# Patient Record
Sex: FEMALE | Race: WHITE | NOT HISPANIC OR LATINO | Employment: OTHER | URBAN - METROPOLITAN AREA
[De-identification: names, ages, dates, MRNs, and addresses within clinical notes are randomized per-mention and may not be internally consistent; named-entity substitution may affect disease eponyms.]

---

## 2017-04-04 ENCOUNTER — OFFICE VISIT (OUTPATIENT)
Dept: FAMILY MEDICINE CLINIC | Facility: CLINIC | Age: 57
End: 2017-04-04

## 2017-04-04 VITALS
DIASTOLIC BLOOD PRESSURE: 82 MMHG | SYSTOLIC BLOOD PRESSURE: 124 MMHG | RESPIRATION RATE: 18 BRPM | WEIGHT: 239 LBS | HEART RATE: 72 BPM | OXYGEN SATURATION: 97 % | BODY MASS INDEX: 40.8 KG/M2 | HEIGHT: 64 IN

## 2017-04-04 DIAGNOSIS — L72.3 SEBACEOUS CYST: ICD-10-CM

## 2017-04-04 DIAGNOSIS — J45.21 REACTIVE AIRWAY DISEASE, MILD INTERMITTENT, WITH ACUTE EXACERBATION: ICD-10-CM

## 2017-04-04 DIAGNOSIS — Z00.00 ANNUAL PHYSICAL EXAM: ICD-10-CM

## 2017-04-04 DIAGNOSIS — E55.9 HYPOVITAMINOSIS D: ICD-10-CM

## 2017-04-04 DIAGNOSIS — J40 BRONCHITIS: Primary | ICD-10-CM

## 2017-04-04 PROCEDURE — 99214 OFFICE O/P EST MOD 30 MIN: CPT | Performed by: FAMILY MEDICINE

## 2017-04-04 RX ORDER — CEFUROXIME AXETIL 250 MG/1
250 TABLET ORAL 2 TIMES DAILY
Qty: 14 TABLET | Refills: 0 | Status: SHIPPED | OUTPATIENT
Start: 2017-04-04 | End: 2017-11-06

## 2017-04-04 RX ORDER — ALBUTEROL SULFATE 90 UG/1
2 AEROSOL, METERED RESPIRATORY (INHALATION) EVERY 4 HOURS PRN
Qty: 1 INHALER | Refills: 11 | Status: SHIPPED | OUTPATIENT
Start: 2017-04-04 | End: 2018-06-15 | Stop reason: SDUPTHER

## 2017-04-04 NOTE — PROGRESS NOTES
"Subjective   Rosalie Mcdonald is a 57 y.o. female.     History of Present Illness CC- Thinks the flu for the last 9 days. Thinks she is 90% better. Temp was 100+ even with alkaselzer cold. Dry cough. \"Horrible\" headache like 10 on scale when she coughs now,  and body aches. Chest congestion with wheezing. Has never used an inhaler. Currently hard to breath at night- doesn't feel like air gets in or out , jackie without wheeze. Sore throat and weakness yesterday.    Knows she has high cholesterol. Wants a panel of labs.        The following portions of the patient's history were reviewed and updated as appropriate: allergies, current medications, past social history and problem list.    Review of Systems   Constitutional: Positive for fatigue and fever. Negative for activity change and unexpected weight change.   HENT: Positive for congestion, postnasal drip, sinus pressure, sneezing and sore throat. Negative for ear pain.    Eyes: Negative for pain and discharge.   Respiratory: Positive for cough and wheezing. Negative for chest tightness and shortness of breath.    Cardiovascular: Negative for chest pain and palpitations.   Gastrointestinal: Negative for abdominal pain, diarrhea and vomiting.   Endocrine: Negative.    Genitourinary: Negative.    Musculoskeletal: Positive for arthralgias. Negative for joint swelling.   Skin: Negative for color change, rash and wound.   Allergic/Immunologic: Negative.    Neurological: Negative for seizures and syncope.   Psychiatric/Behavioral: Negative.        Objective   /82  Pulse 72  Resp 18  Ht 64\" (162.6 cm)  Wt 239 lb (108 kg)  SpO2 97%  BMI 41.02 kg/m2  Physical Exam   Constitutional: She appears well-developed and well-nourished.   HENT:   Head: Normocephalic and atraumatic.   Right Ear: Tympanic membrane, external ear and ear canal normal.   Left Ear: Tympanic membrane, external ear and ear canal normal.   Mouth/Throat: Oropharynx is clear and moist.   Eyes: " Conjunctivae are normal. Right eye exhibits no discharge. Left eye exhibits no discharge.   Neck: Normal range of motion. Neck supple. No thyromegaly present.   Cardiovascular: Normal rate, regular rhythm and normal heart sounds.    Pulmonary/Chest: Effort normal. No respiratory distress. She has wheezes (mild bilat). She has no rales.   Cough sounds congested   Lymphadenopathy:     She has no cervical adenopathy.   Skin: Skin is warm and dry.   R post shoulder 3 cm eryth tender seb cyst.  Scalp is tender to palpation on R parietal area.   Psychiatric: She has a normal mood and affect. Judgment normal.   Vitals reviewed.      Assessment/Plan   Problem List Items Addressed This Visit     None      Visit Diagnoses     Bronchitis    -  Primary    Relevant Medications    albuterol (PROVENTIL HFA;VENTOLIN HFA) 108 (90 BASE) MCG/ACT inhaler    Sebaceous cyst        Relevant Medications    cefuroxime (CEFTIN) 250 MG tablet    Other Relevant Orders    Ambulatory Referral to General Surgery    Reactive airway disease, mild intermittent, with acute exacerbation        Relevant Medications    albuterol (PROVENTIL HFA;VENTOLIN HFA) 108 (90 BASE) MCG/ACT inhaler    Annual physical exam        Relevant Orders    CBC & Differential    Comprehensive Metabolic Panel    Lipid Panel With / Chol / HDL Ratio    TSH    Hypovitaminosis D        Relevant Orders    Vitamin D 25 Hydroxy

## 2017-04-05 LAB
25(OH)D3+25(OH)D2 SERPL-MCNC: 15.5 NG/ML (ref 30–100)
ALBUMIN SERPL-MCNC: 4.1 G/DL (ref 3.5–5.2)
ALBUMIN/GLOB SERPL: 1.4 G/DL
ALP SERPL-CCNC: 122 U/L (ref 39–117)
ALT SERPL-CCNC: 17 U/L (ref 1–33)
AST SERPL-CCNC: 17 U/L (ref 1–32)
BASOPHILS # BLD AUTO: 0.01 10*3/MM3 (ref 0–0.2)
BASOPHILS NFR BLD AUTO: 0.2 % (ref 0–1.5)
BILIRUB SERPL-MCNC: 0.3 MG/DL (ref 0.1–1.2)
BUN SERPL-MCNC: 13 MG/DL (ref 6–20)
BUN/CREAT SERPL: 19.1 (ref 7–25)
CALCIUM SERPL-MCNC: 9.8 MG/DL (ref 8.6–10.5)
CHLORIDE SERPL-SCNC: 103 MMOL/L (ref 98–107)
CHOLEST SERPL-MCNC: 194 MG/DL (ref 0–200)
CHOLEST/HDLC SERPL: 4.97 {RATIO}
CO2 SERPL-SCNC: 27.8 MMOL/L (ref 22–29)
CREAT SERPL-MCNC: 0.68 MG/DL (ref 0.57–1)
EOSINOPHIL # BLD AUTO: 0.06 10*3/MM3 (ref 0–0.7)
EOSINOPHIL NFR BLD AUTO: 1 % (ref 0.3–6.2)
ERYTHROCYTE [DISTWIDTH] IN BLOOD BY AUTOMATED COUNT: 13.6 % (ref 11.7–13)
GLOBULIN SER CALC-MCNC: 2.9 GM/DL
GLUCOSE SERPL-MCNC: 93 MG/DL (ref 65–99)
HCT VFR BLD AUTO: 40.8 % (ref 35.6–45.5)
HDLC SERPL-MCNC: 39 MG/DL (ref 40–60)
HGB BLD-MCNC: 12.5 G/DL (ref 11.9–15.5)
IMM GRANULOCYTES # BLD: 0.02 10*3/MM3 (ref 0–0.03)
IMM GRANULOCYTES NFR BLD: 0.3 % (ref 0–0.5)
LDLC SERPL CALC-MCNC: 119 MG/DL (ref 0–100)
LYMPHOCYTES # BLD AUTO: 2.21 10*3/MM3 (ref 0.9–4.8)
LYMPHOCYTES NFR BLD AUTO: 38.5 % (ref 19.6–45.3)
MCH RBC QN AUTO: 27.8 PG (ref 26.9–32)
MCHC RBC AUTO-ENTMCNC: 30.6 G/DL (ref 32.4–36.3)
MCV RBC AUTO: 90.7 FL (ref 80.5–98.2)
MONOCYTES # BLD AUTO: 0.29 10*3/MM3 (ref 0.2–1.2)
MONOCYTES NFR BLD AUTO: 5.1 % (ref 5–12)
NEUTROPHILS # BLD AUTO: 3.15 10*3/MM3 (ref 1.9–8.1)
NEUTROPHILS NFR BLD AUTO: 54.9 % (ref 42.7–76)
PLATELET # BLD AUTO: 306 10*3/MM3 (ref 140–500)
POTASSIUM SERPL-SCNC: 4.8 MMOL/L (ref 3.5–5.2)
PROT SERPL-MCNC: 7 G/DL (ref 6–8.5)
RBC # BLD AUTO: 4.5 10*6/MM3 (ref 3.9–5.2)
SODIUM SERPL-SCNC: 143 MMOL/L (ref 136–145)
TRIGL SERPL-MCNC: 179 MG/DL (ref 0–150)
TSH SERPL DL<=0.005 MIU/L-ACNC: 1.57 MIU/ML (ref 0.27–4.2)
VLDLC SERPL CALC-MCNC: 35.8 MG/DL (ref 5–40)
WBC # BLD AUTO: 5.74 10*3/MM3 (ref 4.5–10.7)

## 2017-09-22 ENCOUNTER — OFFICE VISIT (OUTPATIENT)
Dept: FAMILY MEDICINE CLINIC | Facility: CLINIC | Age: 57
End: 2017-09-22

## 2017-09-22 VITALS
OXYGEN SATURATION: 100 % | WEIGHT: 242 LBS | TEMPERATURE: 98.9 F | DIASTOLIC BLOOD PRESSURE: 82 MMHG | HEIGHT: 64 IN | HEART RATE: 72 BPM | SYSTOLIC BLOOD PRESSURE: 124 MMHG | BODY MASS INDEX: 41.32 KG/M2

## 2017-09-22 DIAGNOSIS — D22.9 ATYPICAL NEVI: ICD-10-CM

## 2017-09-22 DIAGNOSIS — N81.4 UTERINE PROLAPSE: ICD-10-CM

## 2017-09-22 DIAGNOSIS — E66.01 MORBID OBESITY DUE TO EXCESS CALORIES (HCC): ICD-10-CM

## 2017-09-22 DIAGNOSIS — R53.82 CHRONIC FATIGUE: Primary | ICD-10-CM

## 2017-09-22 DIAGNOSIS — E55.9 HYPOVITAMINOSIS D: ICD-10-CM

## 2017-09-22 LAB — 25(OH)D3+25(OH)D2 SERPL-MCNC: 22.8 NG/ML (ref 30–100)

## 2017-09-22 PROCEDURE — 99213 OFFICE O/P EST LOW 20 MIN: CPT | Performed by: FAMILY MEDICINE

## 2017-09-22 NOTE — PROGRESS NOTES
"Subjective   Rosalie Mcdonald is a 57 y.o. female.     History of Present Illness . CCis fatigue, and wants lab work done. Interested in Contrave pill bc believes wt loss will give her energy. BMI is 41.5.  Not leaving house. Years ago welbutrin helped her with wt loss and energy. She likes the idea that Contrave has welbutrin . She will private pay for it.  Goal: would like to be able to ride horses again.    Has a bladder prolapse she wants seen by gynecol and has not been to one for more than 10 years.    Gym- treadmill, rowing machine, weights.  The following portions of the patient's history were reviewed and updated as appropriate: allergies, current medications, past social history and problem list.    Review of Systems   Constitutional: Positive for fatigue. Negative for activity change, appetite change and unexpected weight change.   HENT: Negative for nosebleeds and trouble swallowing.    Eyes: Negative for pain and visual disturbance.   Respiratory: Negative for chest tightness, shortness of breath and wheezing.    Cardiovascular: Negative for chest pain and palpitations.   Gastrointestinal: Negative for abdominal pain and blood in stool.   Endocrine: Negative.    Genitourinary: Positive for difficulty urinating (prolapsed uterus.). Negative for hematuria.   Musculoskeletal: Negative for joint swelling.   Skin: Negative for color change and rash.   Allergic/Immunologic: Negative.    Neurological: Negative for syncope and speech difficulty.   Hematological: Negative for adenopathy.   Psychiatric/Behavioral: Negative for agitation and confusion.   All other systems reviewed and are negative.      Objective   /82 (BP Location: Right arm, Patient Position: Sitting, Cuff Size: Adult)  Pulse 72  Temp 98.9 °F (37.2 °C) (Oral)   Ht 64\" (162.6 cm)  Wt 242 lb (110 kg)  SpO2 100%  BMI 41.54 kg/m2  Physical Exam   Constitutional: She is oriented to person, place, and time. Vital signs are normal. She " appears well-developed and well-nourished. No distress.   HENT:   Head: Normocephalic.   Neck: Carotid bruit is not present. No thyromegaly present.   Cardiovascular: Normal rate, regular rhythm and normal heart sounds.    Pulmonary/Chest: Effort normal and breath sounds normal.   Neurological: She is alert and oriented to person, place, and time. Gait normal.   Psychiatric: She has a normal mood and affect. Her behavior is normal. Judgment and thought content normal.   Vitals reviewed.      Assessment/Plan   Problem List Items Addressed This Visit        Other    Chronic fatigue - Primary    Relevant Medications    naltrexone-bupropion ER (CONTRAVE) 8-90 MG tablet      Other Visit Diagnoses     Morbid obesity due to excess calories        Relevant Medications    naltrexone-bupropion ER (CONTRAVE) 8-90 MG tablet    Hypovitaminosis D        Relevant Orders    Vitamin D 25 Hydroxy (Completed)    Atypical nevi        Relevant Orders    Ambulatory Referral to Dermatology    Uterine prolapse        Relevant Orders    Ambulatory Referral to Gynecology           Did discuss the poss of increased depression with contrave. She will immed call if  becomes depressed..  Increase the gym to be done on a reg  Basis.

## 2017-09-23 PROBLEM — R53.82 CHRONIC FATIGUE: Status: ACTIVE | Noted: 2017-09-23

## 2017-11-06 ENCOUNTER — OFFICE VISIT (OUTPATIENT)
Dept: FAMILY MEDICINE CLINIC | Facility: CLINIC | Age: 57
End: 2017-11-06

## 2017-11-06 VITALS
OXYGEN SATURATION: 97 % | HEIGHT: 64 IN | DIASTOLIC BLOOD PRESSURE: 84 MMHG | HEART RATE: 67 BPM | RESPIRATION RATE: 18 BRPM | WEIGHT: 238 LBS | BODY MASS INDEX: 40.63 KG/M2 | SYSTOLIC BLOOD PRESSURE: 128 MMHG

## 2017-11-06 DIAGNOSIS — G61.0 GUILLAIN BARRÉ SYNDROME (HCC): ICD-10-CM

## 2017-11-06 DIAGNOSIS — F32.A DEPRESSION, UNSPECIFIED DEPRESSION TYPE: Primary | ICD-10-CM

## 2017-11-06 PROCEDURE — 99214 OFFICE O/P EST MOD 30 MIN: CPT | Performed by: FAMILY MEDICINE

## 2017-11-06 RX ORDER — BUPROPION HYDROCHLORIDE 300 MG/1
TABLET ORAL
Qty: 30 TABLET | Refills: 5 | Status: SHIPPED | OUTPATIENT
Start: 2017-11-06 | End: 2018-06-15 | Stop reason: SDUPTHER

## 2017-11-06 NOTE — PROGRESS NOTES
"Subjective   Rosalie Mcdonald is a 57 y.o. female.     History of Present Illness   Wt loss 4 pounds. Was unable to take 2 bid of Contrave, shaky, nauseous slightly, and nervous. Aims for 1200 greg per day. In gen she felt close to that number. Has been eating in general low salt.   Treadmill at the gym. [place] of [place] has lost muscle mass. 3x a week, walking for 40 min.  She thinks will just do better on wellbutren which she has been on in the past.    Says she has flares of BGS. Feet feel like lead at times. Stress brings it on. Even eyes feel sheaqvy at times, which she says she had with GBS.    The following portions of the patient's history were reviewed and updated as appropriate: allergies, current medications, past social history and problem list.    Review of Systems   Constitutional: Positive for fatigue. Negative for activity change, appetite change, fever and unexpected weight change.   HENT: Negative for nosebleeds and trouble swallowing.    Eyes: Negative for pain and visual disturbance.   Respiratory: Negative for chest tightness, shortness of breath and wheezing.    Cardiovascular: Negative for chest pain and palpitations.   Gastrointestinal: Negative for abdominal pain and blood in stool.   Endocrine: Negative.    Genitourinary: Negative for difficulty urinating and hematuria.   Musculoskeletal: Positive for arthralgias (feet and legs ache and feel heavy at times). Negative for joint swelling.   Skin: Negative for color change and rash.   Allergic/Immunologic: Negative.    Neurological: Negative for syncope and speech difficulty.   Hematological: Negative for adenopathy.   Psychiatric/Behavioral: Negative for agitation and confusion.   All other systems reviewed and are negative.      Objective   /84  Pulse 67  Resp 18  Ht 64\" (162.6 cm)  Wt 238 lb (108 kg)  SpO2 97%  BMI 40.85 kg/m2  Physical Exam   Constitutional: She is oriented to person, place, and time. She appears " well-developed and well-nourished. No distress.   HENT:   Head: Normocephalic and atraumatic.   Eyes: Conjunctivae and EOM are normal. Pupils are equal, round, and reactive to light. Right eye exhibits no discharge. Left eye exhibits no discharge. No scleral icterus.   Neck: Normal range of motion. Neck supple. No tracheal deviation present. No thyromegaly present.   Cardiovascular: Normal rate, regular rhythm, normal heart sounds, intact distal pulses and normal pulses.  Exam reveals no gallop.    No murmur heard.  Pulmonary/Chest: Effort normal and breath sounds normal. No respiratory distress. She has no wheezes. She has no rales.   Musculoskeletal: Normal range of motion.   Neurological: She is alert and oriented to person, place, and time. She exhibits normal muscle tone. Coordination normal.   Skin: Skin is warm. No rash noted. No erythema. No pallor.   Psychiatric: She has a normal mood and affect. Her behavior is normal. Judgment and thought content normal.   Nursing note and vitals reviewed.      Assessment/Plan   Problem List Items Addressed This Visit        Digestive    BMI 40.0-44.9, adult       Nervous and Auditory    Guillain Barré syndrome      Other Visit Diagnoses     Depression, unspecified depression type    -  Primary    Relevant Medications    buPROPion XL (WELLBUTRIN XL) 300 MG 24 hr tablet           Pt needs to further cut down on calories and continue exercise.  I have removed hypertension from her problem list today as she is not any antihypertensives and her BP is clearly normal.    I suspect that anything that causes fatigue in her life, for example shopping in a mall, gives her an increase in the feelings she had with GBS. However, it is not a true recurrence.

## 2018-06-15 ENCOUNTER — OFFICE VISIT (OUTPATIENT)
Dept: FAMILY MEDICINE CLINIC | Facility: CLINIC | Age: 58
End: 2018-06-15

## 2018-06-15 VITALS
HEIGHT: 64 IN | WEIGHT: 240.3 LBS | SYSTOLIC BLOOD PRESSURE: 122 MMHG | OXYGEN SATURATION: 98 % | DIASTOLIC BLOOD PRESSURE: 70 MMHG | BODY MASS INDEX: 41.03 KG/M2 | HEART RATE: 75 BPM | TEMPERATURE: 97.5 F

## 2018-06-15 DIAGNOSIS — Z12.4 SCREENING FOR CERVICAL CANCER: ICD-10-CM

## 2018-06-15 DIAGNOSIS — F32.A DEPRESSION, UNSPECIFIED DEPRESSION TYPE: ICD-10-CM

## 2018-06-15 DIAGNOSIS — E55.9 VITAMIN D DEFICIENCY: ICD-10-CM

## 2018-06-15 DIAGNOSIS — Z11.59 NEED FOR HEPATITIS C SCREENING TEST: ICD-10-CM

## 2018-06-15 DIAGNOSIS — Z13.29 SCREENING FOR THYROID DISORDER: ICD-10-CM

## 2018-06-15 DIAGNOSIS — Z12.39 SCREENING FOR BREAST CANCER: Primary | ICD-10-CM

## 2018-06-15 DIAGNOSIS — Z13.220 SCREENING FOR LIPOID DISORDERS: ICD-10-CM

## 2018-06-15 DIAGNOSIS — Z86.69 HISTORY OF GUILLAIN-BARRE SYNDROME: ICD-10-CM

## 2018-06-15 DIAGNOSIS — Z79.899 MEDICATION MANAGEMENT: ICD-10-CM

## 2018-06-15 DIAGNOSIS — J45.21 REACTIVE AIRWAY DISEASE, MILD INTERMITTENT, WITH ACUTE EXACERBATION: ICD-10-CM

## 2018-06-15 PROCEDURE — 99214 OFFICE O/P EST MOD 30 MIN: CPT | Performed by: NURSE PRACTITIONER

## 2018-06-15 RX ORDER — ALBUTEROL SULFATE 90 UG/1
2 AEROSOL, METERED RESPIRATORY (INHALATION) EVERY 4 HOURS PRN
Qty: 1 INHALER | Refills: 11 | Status: SHIPPED | OUTPATIENT
Start: 2018-06-15 | End: 2019-12-30 | Stop reason: SDUPTHER

## 2018-06-15 RX ORDER — BUPROPION HYDROCHLORIDE 300 MG/1
TABLET ORAL
Qty: 30 TABLET | Refills: 5 | Status: SHIPPED | OUTPATIENT
Start: 2018-06-15 | End: 2019-01-28 | Stop reason: SDUPTHER

## 2018-06-15 NOTE — PROGRESS NOTES
Subjective   Rosalie Mcdonald is a 58 y.o. female presents as a new patient to get established and for medication refills. Currently on wellbutrin 300 mg daily. Working well. Has helped with weight loss previously but not now.    She has a history of guillain barre syndrome, followed by neurology, states she needs to get a referral to a new neurologist.    Last pap smear 10-15 years ago. Agreeable to get established. Not up to date on mammogram, has previously had an abnormal and would like to get that done prior to GYN referral.    Depression   Visit Type: follow-up  Patient presents with the following symptoms: excessive worry, irritability and nervousness/anxiety.  Patient is not experiencing: suicidal ideas, suicidal planning and thoughts of death.  Frequency of symptoms: most days   Severity: moderate   Sleep quality: good  Nighttime awakenings: none  Compliance with medications:  %             The following portions of the patient's history were reviewed and updated as appropriate: allergies, current medications, past family history, past medical history, past social history, past surgical history and problem list.    Review of Systems   Constitutional: Positive for irritability.   HENT: Negative.    Eyes: Negative.    Respiratory: Negative.    Cardiovascular: Negative.    Gastrointestinal: Negative.    Endocrine: Negative.    Genitourinary: Negative.    Musculoskeletal: Negative.    Skin: Negative.    Allergic/Immunologic: Negative.    Neurological: Negative.    Hematological: Negative.    Psychiatric/Behavioral: Negative for suicidal ideas. The patient is nervous/anxious.        Objective   Physical Exam   Constitutional: She is oriented to person, place, and time. She appears well-developed and well-nourished.   HENT:   Head: Normocephalic and atraumatic.   Right Ear: Tympanic membrane and ear canal normal.   Left Ear: Tympanic membrane and ear canal normal.   Nose: Nose normal.   Mouth/Throat: Uvula is  midline, oropharynx is clear and moist and mucous membranes are normal.   Eyes: Pupils are equal, round, and reactive to light.   Neck: Neck supple.   Cardiovascular: Normal rate, regular rhythm and normal heart sounds.  Exam reveals no gallop and no friction rub.    No murmur heard.  Pulmonary/Chest: Effort normal and breath sounds normal. No respiratory distress. She has no wheezes. She has no rales.   Abdominal: Soft. Bowel sounds are normal. She exhibits no distension. There is no tenderness.   Neurological: She is alert and oriented to person, place, and time.   Skin: Skin is warm and dry.   Psychiatric: She has a normal mood and affect.   Vitals reviewed.      Assessment/Plan   Rosalie was seen today for depression.    Diagnoses and all orders for this visit:    Screening for breast cancer  -     Mammo Screening Bilateral With CAD; Future    Depression, unspecified depression type  -     buPROPion XL (WELLBUTRIN XL) 300 MG 24 hr tablet; 1 per day    Reactive airway disease, mild intermittent, with acute exacerbation  -     albuterol (PROVENTIL HFA;VENTOLIN HFA) 108 (90 Base) MCG/ACT inhaler; Inhale 2 puffs Every 4 (Four) Hours As Needed (2 puffs q 4 hr prn).    Screening for cervical cancer  -     Ambulatory Referral to Gynecology    Need for hepatitis C screening test  -     Hepatitis C antibody    Screening for thyroid disorder  -     T4  -     TSH    Vitamin D deficiency  -     Vitamin D 25 hydroxy    History of Guillain-Sully syndrome  -     Ambulatory Referral to Neurology    Screening for lipoid disorders  -     Lipid Panel With LDL / HDL Ratio    Medication management  -     Comprehensive metabolic panel

## 2018-06-15 NOTE — PATIENT INSTRUCTIONS
Labs today.  Mammogram in the near future.  Referral to Dr. Henry, GYN  Referral to neurology  Continue wellbutrin as prescribed.

## 2018-06-16 LAB
25(OH)D3+25(OH)D2 SERPL-MCNC: 32 NG/ML (ref 30–100)
ALBUMIN SERPL-MCNC: 4.4 G/DL (ref 3.5–5.2)
ALBUMIN/GLOB SERPL: 1.6 G/DL
ALP SERPL-CCNC: 132 U/L (ref 39–117)
ALT SERPL-CCNC: 7 U/L (ref 1–33)
AST SERPL-CCNC: 14 U/L (ref 1–32)
BILIRUB SERPL-MCNC: 0.3 MG/DL (ref 0.1–1.2)
BUN SERPL-MCNC: 16 MG/DL (ref 6–20)
BUN/CREAT SERPL: 20 (ref 7–25)
CALCIUM SERPL-MCNC: 10.5 MG/DL (ref 8.6–10.5)
CHLORIDE SERPL-SCNC: 102 MMOL/L (ref 98–107)
CHOLEST SERPL-MCNC: 254 MG/DL (ref 0–200)
CO2 SERPL-SCNC: 28.2 MMOL/L (ref 22–29)
CREAT SERPL-MCNC: 0.8 MG/DL (ref 0.57–1)
GFR SERPLBLD CREATININE-BSD FMLA CKD-EPI: 74 ML/MIN/1.73
GFR SERPLBLD CREATININE-BSD FMLA CKD-EPI: 90 ML/MIN/1.73
GLOBULIN SER CALC-MCNC: 2.8 GM/DL
GLUCOSE SERPL-MCNC: 100 MG/DL (ref 65–99)
HCV AB S/CO SERPL IA: <0.1 S/CO RATIO (ref 0–0.9)
HDLC SERPL-MCNC: 51 MG/DL (ref 40–60)
LDLC SERPL CALC-MCNC: 166 MG/DL (ref 0–100)
LDLC/HDLC SERPL: 3.25 {RATIO}
POTASSIUM SERPL-SCNC: 4.6 MMOL/L (ref 3.5–5.2)
PROT SERPL-MCNC: 7.2 G/DL (ref 6–8.5)
SODIUM SERPL-SCNC: 143 MMOL/L (ref 136–145)
T4 SERPL-MCNC: 8.48 MCG/DL (ref 4.5–11.7)
TRIGL SERPL-MCNC: 185 MG/DL (ref 0–150)
TSH SERPL DL<=0.005 MIU/L-ACNC: 3.54 MIU/ML (ref 0.27–4.2)
VLDLC SERPL CALC-MCNC: 37 MG/DL (ref 5–40)

## 2018-06-21 ENCOUNTER — HOSPITAL ENCOUNTER (OUTPATIENT)
Dept: MAMMOGRAPHY | Facility: HOSPITAL | Age: 58
Discharge: HOME | End: 2018-06-21
Admitting: NURSE PRACTITIONER

## 2018-06-21 DIAGNOSIS — Z12.39 SCREENING FOR BREAST CANCER: ICD-10-CM

## 2018-06-21 PROCEDURE — 77067 SCR MAMMO BI INCL CAD: CPT

## 2018-06-25 DIAGNOSIS — R92.8 ABNORMALITY OF LEFT BREAST ON SCREENING MAMMOGRAM: Primary | ICD-10-CM

## 2018-06-29 ENCOUNTER — HOSPITAL ENCOUNTER (OUTPATIENT)
Dept: ULTRASOUND IMAGING | Facility: HOSPITAL | Age: 58
Discharge: HOME | End: 2018-06-29

## 2018-06-29 ENCOUNTER — HOSPITAL ENCOUNTER (OUTPATIENT)
Dept: MAMMOGRAPHY | Facility: HOSPITAL | Age: 58
Discharge: HOME | End: 2018-06-29
Admitting: NURSE PRACTITIONER

## 2018-06-29 DIAGNOSIS — R92.8 ABNORMALITY OF LEFT BREAST ON SCREENING MAMMOGRAM: ICD-10-CM

## 2018-06-29 PROCEDURE — 76642 ULTRASOUND BREAST LIMITED: CPT

## 2018-06-29 PROCEDURE — 77065 DX MAMMO INCL CAD UNI: CPT

## 2018-10-10 ENCOUNTER — OFFICE VISIT (OUTPATIENT)
Dept: OBSTETRICS AND GYNECOLOGY | Facility: CLINIC | Age: 58
End: 2018-10-10

## 2018-10-10 ENCOUNTER — PROCEDURE VISIT (OUTPATIENT)
Dept: OBSTETRICS AND GYNECOLOGY | Facility: CLINIC | Age: 58
End: 2018-10-10

## 2018-10-10 VITALS
HEIGHT: 64 IN | WEIGHT: 238.2 LBS | SYSTOLIC BLOOD PRESSURE: 148 MMHG | DIASTOLIC BLOOD PRESSURE: 82 MMHG | BODY MASS INDEX: 40.67 KG/M2

## 2018-10-10 DIAGNOSIS — N85.2 BULKY OR ENLARGED UTERUS: Primary | ICD-10-CM

## 2018-10-10 DIAGNOSIS — R21 RASH: ICD-10-CM

## 2018-10-10 DIAGNOSIS — N64.59 ABNORMAL BREAST FINDING: ICD-10-CM

## 2018-10-10 DIAGNOSIS — N81.2 INCOMPLETE UTEROVAGINAL PROLAPSE: ICD-10-CM

## 2018-10-10 DIAGNOSIS — Z13.9 SCREENING FOR CONDITION: Primary | ICD-10-CM

## 2018-10-10 DIAGNOSIS — R10.2 PELVIC PAIN IN FEMALE: ICD-10-CM

## 2018-10-10 DIAGNOSIS — Z01.419 WELL WOMAN EXAM WITH ROUTINE GYNECOLOGICAL EXAM: ICD-10-CM

## 2018-10-10 DIAGNOSIS — N94.10 DYSPAREUNIA, FEMALE: ICD-10-CM

## 2018-10-10 DIAGNOSIS — D21.9 FIBROIDS: ICD-10-CM

## 2018-10-10 LAB
BILIRUB BLD-MCNC: NEGATIVE MG/DL
CLARITY, POC: CLEAR
COLOR UR: YELLOW
GLUCOSE UR STRIP-MCNC: NEGATIVE MG/DL
KETONES UR QL: ABNORMAL
LEUKOCYTE EST, POC: NEGATIVE
NITRITE UR-MCNC: NEGATIVE MG/ML
PH UR: 5 [PH] (ref 5–8)
PROT UR STRIP-MCNC: NEGATIVE MG/DL
RBC # UR STRIP: NEGATIVE /UL
SP GR UR: 1 (ref 1–1.03)
UROBILINOGEN UR QL: NORMAL

## 2018-10-10 PROCEDURE — 99386 PREV VISIT NEW AGE 40-64: CPT | Performed by: OBSTETRICS & GYNECOLOGY

## 2018-10-10 PROCEDURE — 81002 URINALYSIS NONAUTO W/O SCOPE: CPT | Performed by: OBSTETRICS & GYNECOLOGY

## 2018-10-10 PROCEDURE — 99213 OFFICE O/P EST LOW 20 MIN: CPT | Performed by: OBSTETRICS & GYNECOLOGY

## 2018-10-10 PROCEDURE — 76856 US EXAM PELVIC COMPLETE: CPT | Performed by: OBSTETRICS & GYNECOLOGY

## 2018-10-10 RX ORDER — CLOTRIMAZOLE AND BETAMETHASONE DIPROPIONATE 10; .64 MG/G; MG/G
CREAM TOPICAL
Qty: 45 G | Refills: 0 | Status: SHIPPED | OUTPATIENT
Start: 2018-10-10 | End: 2021-03-19

## 2018-10-10 NOTE — PROGRESS NOTES
GYN Annual Exam     CC- Here for annual exam.     Rosalie Mcdonald is a 58 y.o. female new patient who presents for annual well woman exam. She has been menopausal since age 48 and has never been on HRT> She has pain with sex and feels pelvic prolapse. She also has a rash on her left breast that does not itch but has been there for a few months. Her last pap was > 10 years ago.      OB History      Para Term  AB Living    2 2 2     2    SAB TAB Ectopic Molar Multiple Live Births                       Obstetric Comments    2 C/S          Menarche:12  Menopause:48  HRT:none  Current contraception: post menopausal status and tubal ligation  History of abnormal Pap smear: no  History of abnormal mammogram: no  Family history of uterine, colon or ovarian cancer: no  Family history of breast cancer: no  STD's: none  Last pap smear:> 10 years ago      Health Maintenance   Topic Date Due   • ANNUAL PHYSICAL  1963   • TDAP/TD VACCINES (1 - Tdap) 1980   • ZOSTER VACCINE (1 of 2) 2011   • PAP SMEAR  2017   • INFLUENZA VACCINE  2018   • COLONOSCOPY  2019   • LIPID PANEL  06/15/2019   • MAMMOGRAM  2020   • HEPATITIS C SCREENING  Completed       Past Medical History:   Diagnosis Date   • Anemia    • Asthma    • Bleeding tendency (CMS/HCC)    • Cancer (CMS/HCC)     h/o skin cancer   • Cervical spondylitis    • Depression    • Diabetes mellitus (CMS/HCC)     type 2   • Guillain-Bidwell syndrome (CMS/HCC)    • Kidney stones    • Osteoarthritis        Past Surgical History:   Procedure Laterality Date   • ANTERIOR CERVICAL DISCECTOMY W/ FUSION N/A 2015    C4-C5 and C5-C6 anterior cervical diskectomy and fusion with VG2 allograft and Eagle anterior plate fixation-Dr. Roman Green   •  SECTION N/A     x 2   • GASTRIC BYPASS N/A    • INSERT / REPLACE / VENOUS ACCESS CATHETER Right 2015    Ultrasound-guided vascular access, rightinternal jugular non-tunneled  "plasmpheresis catheter placement-Dr. German Linder   • TONSILLECTOMY N/A    • TUBAL ABDOMINAL LIGATION           Current Outpatient Prescriptions:   •  albuterol (PROVENTIL HFA;VENTOLIN HFA) 108 (90 Base) MCG/ACT inhaler, Inhale 2 puffs Every 4 (Four) Hours As Needed (2 puffs q 4 hr prn)., Disp: 1 inhaler, Rfl: 11  •  buPROPion XL (WELLBUTRIN XL) 300 MG 24 hr tablet, 1 per day, Disp: 30 tablet, Rfl: 5  •  clotrimazole-betamethasone (LOTRISONE) 1-0.05 % cream, Use BID x 14 days, Disp: 45 g, Rfl: 0    Allergies   Allergen Reactions   • Latex        Social History   Substance Use Topics   • Smoking status: Never Smoker   • Smokeless tobacco: Never Used   • Alcohol use No       Family History   Problem Relation Age of Onset   • Leukemia Other    • Clotting disorder Other    • Hypertension Other    • Heart disease Other    • Stroke Maternal Grandfather    • Deep vein thrombosis Mother    • Breast cancer Neg Hx    • Ovarian cancer Neg Hx    • Colon cancer Neg Hx        Review of Systems   Constitutional: Positive for unexpected weight change (gain). Negative for appetite change, fatigue and fever.   Eyes: Negative for photophobia and visual disturbance.   Respiratory: Negative for cough and shortness of breath.    Cardiovascular: Negative for chest pain and palpitations.   Gastrointestinal: Negative for abdominal distention, abdominal pain, constipation, diarrhea and nausea.   Endocrine: Negative for cold intolerance and heat intolerance.   Genitourinary: Positive for dyspareunia, pelvic pain and vaginal pain. Negative for dysuria, menstrual problem and vaginal discharge.   Skin: Positive for rash. Negative for color change.   Neurological: Positive for weakness (in leg from GB) and numbness (from GB). Negative for headaches.   Psychiatric/Behavioral: Negative for dysphoric mood. The patient is not nervous/anxious.        /82   Ht 162.6 cm (64\")   Wt 108 kg (238 lb 3.2 oz)   BMI 40.89 kg/m²     Physical Exam "   Constitutional: She is oriented to person, place, and time. She appears well-developed and well-nourished.   HENT:   Head: Normocephalic and atraumatic.   Eyes: Conjunctivae are normal. No scleral icterus.   Neck: Neck supple. No thyromegaly present.   Cardiovascular: Normal rate and regular rhythm.    Pulmonary/Chest: Effort normal and breath sounds normal. Right breast exhibits no inverted nipple, no mass, no nipple discharge, no skin change and no tenderness. Left breast exhibits skin change. Left breast exhibits no inverted nipple, no mass, no nipple discharge and no tenderness.       Abdominal: Soft. Bowel sounds are normal. She exhibits no distension and no mass. There is no tenderness. There is no rebound and no guarding. No hernia.   Genitourinary: Pelvic exam was performed with patient supine. There is no rash, tenderness or lesion on the right labia. There is no rash, tenderness or lesion on the left labia. Uterus is tender. Uterus is not deviated, not enlarged and not fixed. Cervix exhibits no motion tenderness, no discharge and no friability. Right adnexum displays no mass, no tenderness and no fullness. Left adnexum displays no mass, no tenderness and no fullness. There is tenderness in the vagina. No erythema or bleeding in the vagina. No foreign body in the vagina. No signs of injury around the vagina. No vaginal discharge found.   Genitourinary Comments: + LPS  GRade 1 prolapse  TTP over fundus   Neurological: She is alert and oriented to person, place, and time.   Skin: Skin is warm and dry.   Psychiatric: She has a normal mood and affect. Her behavior is normal. Judgment and thought content normal.   Nursing note and vitals reviewed.         Assessment/Plan    1) GYN HM: check pap   SBE demonstrated and encouraged.  2) STD screening: declines Condoms encouraged.  3) Bone health - Weight bearing exercise, dietary calcium recommendations and vitamin D reviewed.   4) Diet and Exercise discussed  5)  Smoking Status: No    6) Rash under L breast- will try Lotrisone cream x 2 weeks and if not resolved will need to see derm for biopsy.   7)MMG:  UTD 6/2018 Birads 3, repeat MMG of L in 6 months.   8) DEXA- schedule  9)C scope-[facility], [facility]  10) TTP on exam- check TVUS  11) LPS- if TVUS is normal, consider pelvic floor PT.   12) RTO 2 weeks recheck breast.    Follow up prn and 1 year       Rosalie was seen today for gynecologic exam.    Diagnoses and all orders for this visit:    Screening for condition  -     POC Urinalysis Dipstick  -     DEXA Bone Density Axial; Future  -     Mammo Diagnostic Left With CAD; Future    Well woman exam with routine gynecological exam  -     Pap IG, HPV-hr    Rash    Dyspareunia, female    Pelvic pain in female    Abnormal breast finding    Incomplete uterovaginal prolapse    Other orders  -     clotrimazole-betamethasone (LOTRISONE) 1-0.05 % cream; Use BID x 14 days        Domenica Davila MD  10/10/18  10:39 PM

## 2018-10-12 NOTE — PROGRESS NOTES
PIP= US shows normal sized uterus with 2 very small fibroids seen < 2 cms, no ovaries seen, no comparable data.

## 2018-10-15 LAB
CYTOLOGIST CVX/VAG CYTO: NORMAL
CYTOLOGY CVX/VAG DOC THIN PREP: NORMAL
DX ICD CODE: NORMAL
HIV 1 & 2 AB SER-IMP: NORMAL
HPV I/H RISK 1 DNA CVX QL PROBE+SIG AMP: NEGATIVE
Lab: NORMAL
OTHER STN SPEC: NORMAL
PATH REPORT.FINAL DX SPEC: NORMAL
STAT OF ADQ CVX/VAG CYTO-IMP: NORMAL

## 2018-10-26 ENCOUNTER — HOSPITAL ENCOUNTER (OUTPATIENT)
Dept: BONE DENSITY | Facility: HOSPITAL | Age: 58
Discharge: HOME | End: 2018-10-26
Attending: OBSTETRICS & GYNECOLOGY | Admitting: OBSTETRICS & GYNECOLOGY

## 2018-10-26 DIAGNOSIS — Z13.9 SCREENING FOR CONDITION: ICD-10-CM

## 2018-10-26 PROCEDURE — 77080 DXA BONE DENSITY AXIAL: CPT

## 2018-12-26 ENCOUNTER — TELEPHONE (OUTPATIENT)
Dept: OBSTETRICS AND GYNECOLOGY | Facility: CLINIC | Age: 58
End: 2018-12-26

## 2018-12-28 ENCOUNTER — HOSPITAL ENCOUNTER (OUTPATIENT)
Dept: MAMMOGRAPHY | Facility: HOSPITAL | Age: 58
Discharge: HOME | End: 2018-12-28
Attending: OBSTETRICS & GYNECOLOGY | Admitting: OBSTETRICS & GYNECOLOGY

## 2018-12-28 DIAGNOSIS — Z13.9 SCREENING FOR CONDITION: ICD-10-CM

## 2018-12-28 PROCEDURE — 77065 DX MAMMO INCL CAD UNI: CPT

## 2019-01-28 ENCOUNTER — OFFICE VISIT (OUTPATIENT)
Dept: FAMILY MEDICINE CLINIC | Facility: CLINIC | Age: 59
End: 2019-01-28

## 2019-01-28 VITALS
OXYGEN SATURATION: 99 % | RESPIRATION RATE: 17 BRPM | SYSTOLIC BLOOD PRESSURE: 130 MMHG | BODY MASS INDEX: 40.27 KG/M2 | HEART RATE: 69 BPM | DIASTOLIC BLOOD PRESSURE: 72 MMHG | HEIGHT: 64 IN | WEIGHT: 235.9 LBS | TEMPERATURE: 98.3 F

## 2019-01-28 DIAGNOSIS — E53.8 B12 DEFICIENCY: ICD-10-CM

## 2019-01-28 DIAGNOSIS — G61.0 GUILLAIN BARRÉ SYNDROME (HCC): ICD-10-CM

## 2019-01-28 DIAGNOSIS — R22.31 MASS OF RIGHT ELBOW: ICD-10-CM

## 2019-01-28 DIAGNOSIS — Z82.49 FAMILY HISTORY OF CONGESTIVE HEART FAILURE: ICD-10-CM

## 2019-01-28 DIAGNOSIS — R03.0 ELEVATED BLOOD PRESSURE READING: ICD-10-CM

## 2019-01-28 DIAGNOSIS — F32.A DEPRESSION, UNSPECIFIED DEPRESSION TYPE: ICD-10-CM

## 2019-01-28 DIAGNOSIS — D64.9 ANEMIA, UNSPECIFIED TYPE: Primary | ICD-10-CM

## 2019-01-28 DIAGNOSIS — I83.813 VARICOSE VEINS OF BOTH LOWER EXTREMITIES WITH PAIN: ICD-10-CM

## 2019-01-28 DIAGNOSIS — R22.32 MASS OF FINGER, LEFT: ICD-10-CM

## 2019-01-28 PROBLEM — F33.41 RECURRENT MAJOR DEPRESSIVE DISORDER, IN PARTIAL REMISSION (HCC): Status: ACTIVE | Noted: 2019-01-28

## 2019-01-28 PROCEDURE — 99215 OFFICE O/P EST HI 40 MIN: CPT | Performed by: FAMILY MEDICINE

## 2019-01-28 RX ORDER — BUPROPION HYDROCHLORIDE 300 MG/1
TABLET ORAL
Qty: 30 TABLET | Refills: 5 | Status: SHIPPED | OUTPATIENT
Start: 2019-01-28 | End: 2019-10-02 | Stop reason: SDUPTHER

## 2019-01-28 NOTE — PROGRESS NOTES
Chief Complaint   Patient presents with   • Med Refill     all meds   • Establish Care     NP to [facility]   • Depression   Previously seen by Dr. Alarcon. Pt is new to me, problems are new to me.      Subjective   Rosalie Mcdonald is a 59 y.o. female.     History of Present Illness   Fhx of CHF  She typically is exercising but related to family concerns she has been out of her routine. Has two cousins, father, and grandmother with severe CHF. Cousin her age who is admitted to palliative care and they said he will not leave the hospital related to his heart failure. Pt is very concerned about this family history. She generally has been good about exercising and her diet. She has hx of gastric bypass surgery and has lost 70 lbs. Gained some back related to out of her routine  unexpected death of her 39 yo sister in law, developed rare form of breast cancer 2018 and  2018. Left behind 1 yo and infant. Pt is helping with care of the children.    Varicose veins  really bad in the left leg. She would like to start having evaluation for possible surgery. She is have leg and foot cramps. Wears compression tights but they are hot and has a lot of trouble with riding in the car.     Hx of severe anemia  Required iron infusion and other infusion. She had very heavy vaginal bleeding. She had recent follow up with gyn, fibroids smaller, no vaginal bleeding for 10 years.     Hx of neck /back surgery  Hx of skin cancer   Hx of Guillian [place] Syndrome, seen by neurology after d/c from hospital. Went to different neurologist related to her confidence in that [facility]. Needs another neurologist. She feels shehas residula. She has numbness and heaviness in the legs like someone pulling them into the floor. Eyes feel grainy and needs to make more effort to blink, numbness tingling on the nose and fingers/hands. Stress seemed to bring them on. Maybe a little better in the last 4-5 months. Felt concerns for MS with the  "symptoms. No urinary incontinence with these episodes.     Mass on her right elbow  Noticed for about 6 months. associated with tenderness, area is hard.   Mass on thumb     The following portions of the patient's history were reviewed and updated as appropriate: allergies, current medications, past family history, past medical history, past social history, past surgical history and problem list.    Review of Systems   Constitutional: Negative for activity change, appetite change and unexpected weight change.   Respiratory: Negative for shortness of breath.    Cardiovascular: Positive for leg swelling. Negative for chest pain.   Genitourinary: Negative for menstrual problem, pelvic pain and vaginal bleeding.   Musculoskeletal: Positive for myalgias. Negative for gait problem.   Neurological: Negative for weakness.   Psychiatric/Behavioral: Positive for dysphoric mood.       Vitals:    01/28/19 1300   BP: 130/72   BP Location: Left arm   Patient Position: Sitting   Cuff Size: Adult   Pulse: 69   Resp: 17   Temp: 98.3 °F (36.8 °C)   TempSrc: Oral   SpO2: 99%   Weight: 107 kg (235 lb 14.4 oz)   Height: 162.6 cm (64.02\")       Objective   Physical Exam   Constitutional: She is oriented to person, place, and time. She appears well-nourished. No distress.   HENT:   Right Ear: External ear normal.   Left Ear: External ear normal.   Nose: Nose normal.   Mouth/Throat: Oropharynx is clear and moist.   Eyes: Conjunctivae are normal. Right eye exhibits no discharge. Left eye exhibits no discharge. No scleral icterus.   Neck: Neck supple. No thyromegaly present.   Cardiovascular: Normal rate, regular rhythm, normal heart sounds and intact distal pulses.   No murmur heard.  Pulmonary/Chest: Effort normal and breath sounds normal. No respiratory distress. She has no wheezes.   Musculoskeletal: She exhibits edema. She exhibits no tenderness or deformity.   Lymphadenopathy:     She has no cervical adenopathy.   Neurological: She is " alert and oriented to person, place, and time. She exhibits normal muscle tone.   Skin: Skin is warm. No rash noted. No erythema.   Psychiatric: She has a normal mood and affect. Her behavior is normal.   Vitals reviewed.      Assessment/Plan   Rosalie was seen today for med refill, establish care and depression.    Diagnoses and all orders for this visit:    Guillain Barré syndrome (CMS/HCC)  -     Ambulatory Referral to Neurology  Pt has significant anxiety with this hx, she feels she does have intermittent residual symptoms that felt just like when she was developing first symptoms. Effects her gait, eyes, sensation. She has not been able to find neurologist.  Mass of right elbow  -     US Nonvascular Extremity Limited; Future  Palpable firm area at the medial aspect of the antecubital fossa, mild tenderness, some overlying skin hyperpigmentation visible. Will evaluate with imaging.   Mass of finger, left  -     Ambulatory Referral to Hand Surgery  Appears cystic, mild calus over the area. Bothering pt. Will refer to hand for evalution and possible removal.  Varicose veins of both lower extremities with pain  -     Ambulatory Referral to Vascular Surgery  Pt with longstanding symptoms. Causing leg aching, swelling. Pt would be interested in surgical correction.  Elevated blood pressure reading  -     Adult Transthoracic Echo Complete W/ Cont if Necessary Per Protocol; Future  Repeat pressure was better, initial reading was 144/90  Family history of congestive heart failure  -     Adult Transthoracic Echo Complete W/ Cont if Necessary Per Protocol; Future  Pt with elevated BP in the office. Says she has never had trouble with HTN but has had more elevated pressures lately. Attributes to weight gain and not working out. Repeat in the office 130/72  Depression, unspecified depression type  -     buPROPion XL (WELLBUTRIN XL) 300 MG 24 hr tablet; 1 per day  She feels the bupropion really helps her. Can't be without it  right now. She is doing a little better with the grieving. Working to get back to her exercise routine.     45 was spent with patient, and greater than 50% of the time was spent counseling regarding concerns above and recommended evaluations, CHF and symptoms, signs, diet and exercise.

## 2019-02-04 ENCOUNTER — HOSPITAL ENCOUNTER (OUTPATIENT)
Dept: ULTRASOUND IMAGING | Facility: HOSPITAL | Age: 59
End: 2019-02-04

## 2019-02-05 ENCOUNTER — HOSPITAL ENCOUNTER (OUTPATIENT)
Dept: ULTRASOUND IMAGING | Facility: HOSPITAL | Age: 59
Discharge: HOME | End: 2019-02-05
Admitting: FAMILY MEDICINE

## 2019-02-05 DIAGNOSIS — R22.31 MASS OF RIGHT ELBOW: ICD-10-CM

## 2019-02-05 PROCEDURE — 76882 US LMTD JT/FCL EVL NVASC XTR: CPT

## 2019-02-15 ENCOUNTER — HOSPITAL ENCOUNTER (OUTPATIENT)
Dept: CARDIOLOGY | Facility: HOSPITAL | Age: 59
Discharge: HOME | End: 2019-02-15
Admitting: FAMILY MEDICINE

## 2019-02-15 VITALS
BODY MASS INDEX: 40.12 KG/M2 | HEIGHT: 64 IN | WEIGHT: 235 LBS | HEART RATE: 80 BPM | OXYGEN SATURATION: 97 % | SYSTOLIC BLOOD PRESSURE: 140 MMHG | DIASTOLIC BLOOD PRESSURE: 100 MMHG

## 2019-02-15 DIAGNOSIS — R03.0 ELEVATED BLOOD PRESSURE READING: ICD-10-CM

## 2019-02-15 DIAGNOSIS — Z82.49 FAMILY HISTORY OF CONGESTIVE HEART FAILURE: ICD-10-CM

## 2019-02-15 LAB
AORTIC ARCH: 2.78 CM
AORTIC DIMENSIONLESS INDEX: 0.7 (DI)
ASCENDING AORTA: 3.5 CM
BH CV ECHO MEAS - ACS: 2 CM
BH CV ECHO MEAS - AO MAX PG (FULL): 3.5 MMHG
BH CV ECHO MEAS - AO MAX PG: 7.3 MMHG
BH CV ECHO MEAS - AO MEAN PG (FULL): 2 MMHG
BH CV ECHO MEAS - AO MEAN PG: 4.3 MMHG
BH CV ECHO MEAS - AO ROOT AREA (BSA CORRECTED): 1.5
BH CV ECHO MEAS - AO ROOT AREA: 7.5 CM^2
BH CV ECHO MEAS - AO ROOT DIAM: 3.1 CM
BH CV ECHO MEAS - AO V2 MAX: 134.8 CM/SEC
BH CV ECHO MEAS - AO V2 MEAN: 100.5 CM/SEC
BH CV ECHO MEAS - AO V2 VTI: 31.1 CM
BH CV ECHO MEAS - ASC AORTA: 3.5 CM
BH CV ECHO MEAS - AVA(I,A): 2.4 CM^2
BH CV ECHO MEAS - AVA(I,D): 2.4 CM^2
BH CV ECHO MEAS - AVA(V,A): 2.5 CM^2
BH CV ECHO MEAS - AVA(V,D): 2.5 CM^2
BH CV ECHO MEAS - BSA(HAYCOCK): 2.2 M^2
BH CV ECHO MEAS - BSA: 2.1 M^2
BH CV ECHO MEAS - BZI_BMI: 40.3 KILOGRAMS/M^2
BH CV ECHO MEAS - BZI_METRIC_HEIGHT: 162.6 CM
BH CV ECHO MEAS - BZI_METRIC_WEIGHT: 106.6 KG
BH CV ECHO MEAS - EDV(CUBED): 113.4 ML
BH CV ECHO MEAS - EDV(MOD-SP2): 51 ML
BH CV ECHO MEAS - EDV(MOD-SP4): 48 ML
BH CV ECHO MEAS - EDV(TEICH): 109.6 ML
BH CV ECHO MEAS - EF(CUBED): 71.6 %
BH CV ECHO MEAS - EF(MOD-BP): 58 %
BH CV ECHO MEAS - EF(MOD-SP2): 56.9 %
BH CV ECHO MEAS - EF(MOD-SP4): 62.5 %
BH CV ECHO MEAS - EF(TEICH): 63.2 %
BH CV ECHO MEAS - ESV(CUBED): 32.2 ML
BH CV ECHO MEAS - ESV(MOD-SP2): 22 ML
BH CV ECHO MEAS - ESV(MOD-SP4): 18 ML
BH CV ECHO MEAS - ESV(TEICH): 40.4 ML
BH CV ECHO MEAS - FS: 34.3 %
BH CV ECHO MEAS - IVS/LVPW: 1
BH CV ECHO MEAS - IVSD: 1.2 CM
BH CV ECHO MEAS - LAT PEAK E' VEL: 7 CM/SEC
BH CV ECHO MEAS - LV DIASTOLIC VOL/BSA (35-75): 22.9 ML/M^2
BH CV ECHO MEAS - LV MASS(C)D: 233.8 GRAMS
BH CV ECHO MEAS - LV MASS(C)DI: 111.6 GRAMS/M^2
BH CV ECHO MEAS - LV MAX PG: 3.8 MMHG
BH CV ECHO MEAS - LV MEAN PG: 2.3 MMHG
BH CV ECHO MEAS - LV SYSTOLIC VOL/BSA (12-30): 8.6 ML/M^2
BH CV ECHO MEAS - LV V1 MAX: 97 CM/SEC
BH CV ECHO MEAS - LV V1 MEAN: 70.3 CM/SEC
BH CV ECHO MEAS - LV V1 VTI: 21.1 CM
BH CV ECHO MEAS - LVIDD: 4.8 CM
BH CV ECHO MEAS - LVIDS: 3.2 CM
BH CV ECHO MEAS - LVLD AP2: 7 CM
BH CV ECHO MEAS - LVLD AP4: 7 CM
BH CV ECHO MEAS - LVLS AP2: 6.3 CM
BH CV ECHO MEAS - LVLS AP4: 5.6 CM
BH CV ECHO MEAS - LVOT AREA (M): 3.5 CM^2
BH CV ECHO MEAS - LVOT AREA: 3.5 CM^2
BH CV ECHO MEAS - LVOT DIAM: 2.1 CM
BH CV ECHO MEAS - LVPWD: 1.2 CM
BH CV ECHO MEAS - MED PEAK E' VEL: 6 CM/SEC
BH CV ECHO MEAS - MV A DUR: 0.11 SEC
BH CV ECHO MEAS - MV A MAX VEL: 88.3 CM/SEC
BH CV ECHO MEAS - MV DEC SLOPE: 426.6 CM/SEC^2
BH CV ECHO MEAS - MV DEC TIME: 0.18 SEC
BH CV ECHO MEAS - MV E MAX VEL: 74.7 CM/SEC
BH CV ECHO MEAS - MV E/A: 0.85
BH CV ECHO MEAS - MV MAX PG: 3 MMHG
BH CV ECHO MEAS - MV MEAN PG: 1.4 MMHG
BH CV ECHO MEAS - MV P1/2T MAX VEL: 80.5 CM/SEC
BH CV ECHO MEAS - MV P1/2T: 55.3 MSEC
BH CV ECHO MEAS - MV V2 MAX: 87.3 CM/SEC
BH CV ECHO MEAS - MV V2 MEAN: 55.5 CM/SEC
BH CV ECHO MEAS - MV V2 VTI: 21.7 CM
BH CV ECHO MEAS - MVA P1/2T LCG: 2.7 CM^2
BH CV ECHO MEAS - MVA(P1/2T): 4 CM^2
BH CV ECHO MEAS - MVA(VTI): 3.4 CM^2
BH CV ECHO MEAS - PA ACC TIME: 0.09 SEC
BH CV ECHO MEAS - PA MAX PG (FULL): 1.1 MMHG
BH CV ECHO MEAS - PA MAX PG: 3.4 MMHG
BH CV ECHO MEAS - PA PR(ACCEL): 37.8 MMHG
BH CV ECHO MEAS - PA V2 MAX: 92.3 CM/SEC
BH CV ECHO MEAS - PULM A REVS DUR: 0.1 SEC
BH CV ECHO MEAS - PULM A REVS VEL: 25.7 CM/SEC
BH CV ECHO MEAS - PULM DIAS VEL: 40 CM/SEC
BH CV ECHO MEAS - PULM S/D: 1.3
BH CV ECHO MEAS - PULM SYS VEL: 53.9 CM/SEC
BH CV ECHO MEAS - PVA(V,A): 2.7 CM^2
BH CV ECHO MEAS - PVA(V,D): 2.7 CM^2
BH CV ECHO MEAS - QP/QS: 0.8
BH CV ECHO MEAS - RAP SYSTOLE: 3 MMHG
BH CV ECHO MEAS - RV MAX PG: 2.3 MMHG
BH CV ECHO MEAS - RV MEAN PG: 1.4 MMHG
BH CV ECHO MEAS - RV V1 MAX: 76.6 CM/SEC
BH CV ECHO MEAS - RV V1 MEAN: 54.6 CM/SEC
BH CV ECHO MEAS - RV V1 VTI: 17.9 CM
BH CV ECHO MEAS - RVOT AREA: 3.3 CM^2
BH CV ECHO MEAS - RVOT DIAM: 2 CM
BH CV ECHO MEAS - RVSP: 14 MMHG
BH CV ECHO MEAS - SI(AO): 111.4 ML/M^2
BH CV ECHO MEAS - SI(CUBED): 38.8 ML/M^2
BH CV ECHO MEAS - SI(LVOT): 35 ML/M^2
BH CV ECHO MEAS - SI(MOD-SP2): 13.8 ML/M^2
BH CV ECHO MEAS - SI(MOD-SP4): 14.3 ML/M^2
BH CV ECHO MEAS - SI(TEICH): 33.1 ML/M^2
BH CV ECHO MEAS - SUP REN AO DIAM: 2.09 CM
BH CV ECHO MEAS - SV(AO): 233.3 ML
BH CV ECHO MEAS - SV(CUBED): 81.2 ML
BH CV ECHO MEAS - SV(LVOT): 73.3 ML
BH CV ECHO MEAS - SV(MOD-SP2): 29 ML
BH CV ECHO MEAS - SV(MOD-SP4): 30 ML
BH CV ECHO MEAS - SV(RVOT): 58.7 ML
BH CV ECHO MEAS - SV(TEICH): 69.3 ML
BH CV ECHO MEAS - TAPSE (>1.6): 2.5 CM2
BH CV ECHO MEAS - TR MAX VEL: 164.8 CM/SEC
BH CV ECHO MEASUREMENTS AVERAGE E/E' RATIO: 11.49
BH CV VAS BP RIGHT ARM: NORMAL MMHG
BH CV XLRA - RV BASE: 3.33 CM
BH CV XLRA - TDI S': 9.25 CM/SEC
LEFT ATRIUM VOLUME INDEX: 22 ML/M2
LV EF 2D ECHO EST: 58 %
MAXIMAL PREDICTED HEART RATE: 162 BPM
SINUS: 2.42 CM
STJ: 2.69 CM
STRESS TARGET HR: 138 BPM

## 2019-02-15 PROCEDURE — 93306 TTE W/DOPPLER COMPLETE: CPT | Performed by: INTERNAL MEDICINE

## 2019-02-15 PROCEDURE — 93306 TTE W/DOPPLER COMPLETE: CPT

## 2019-10-02 DIAGNOSIS — F32.A DEPRESSION, UNSPECIFIED DEPRESSION TYPE: ICD-10-CM

## 2019-10-03 RX ORDER — BUPROPION HYDROCHLORIDE 300 MG/1
TABLET ORAL
Qty: 30 TABLET | Refills: 0 | Status: SHIPPED | OUTPATIENT
Start: 2019-10-03 | End: 2019-11-20 | Stop reason: SDUPTHER

## 2019-11-20 DIAGNOSIS — F32.A DEPRESSION, UNSPECIFIED DEPRESSION TYPE: ICD-10-CM

## 2019-11-20 RX ORDER — BUPROPION HYDROCHLORIDE 300 MG/1
TABLET ORAL
Qty: 30 TABLET | Refills: 2 | Status: SHIPPED | OUTPATIENT
Start: 2019-11-20 | End: 2020-02-21 | Stop reason: SDUPTHER

## 2019-12-30 ENCOUNTER — OFFICE VISIT (OUTPATIENT)
Dept: FAMILY MEDICINE CLINIC | Facility: CLINIC | Age: 59
End: 2019-12-30

## 2019-12-30 VITALS
HEART RATE: 99 BPM | WEIGHT: 236 LBS | RESPIRATION RATE: 20 BRPM | DIASTOLIC BLOOD PRESSURE: 88 MMHG | TEMPERATURE: 99.6 F | HEIGHT: 64 IN | BODY MASS INDEX: 40.29 KG/M2 | SYSTOLIC BLOOD PRESSURE: 157 MMHG | OXYGEN SATURATION: 97 %

## 2019-12-30 DIAGNOSIS — J45.21 REACTIVE AIRWAY DISEASE, MILD INTERMITTENT, WITH ACUTE EXACERBATION: ICD-10-CM

## 2019-12-30 DIAGNOSIS — J11.1 FLU: Primary | ICD-10-CM

## 2019-12-30 LAB
EXPIRATION DATE: NORMAL
FLUAV AG NPH QL: NEGATIVE
FLUBV AG NPH QL: NEGATIVE
INTERNAL CONTROL: NORMAL
Lab: NORMAL

## 2019-12-30 PROCEDURE — 99213 OFFICE O/P EST LOW 20 MIN: CPT | Performed by: NURSE PRACTITIONER

## 2019-12-30 PROCEDURE — 87804 INFLUENZA ASSAY W/OPTIC: CPT | Performed by: NURSE PRACTITIONER

## 2019-12-30 RX ORDER — OSELTAMIVIR PHOSPHATE 75 MG/1
75 CAPSULE ORAL 2 TIMES DAILY
Qty: 10 CAPSULE | Refills: 0 | Status: SHIPPED | OUTPATIENT
Start: 2019-12-30 | End: 2020-01-04

## 2019-12-30 RX ORDER — ALBUTEROL SULFATE 90 UG/1
2 AEROSOL, METERED RESPIRATORY (INHALATION) EVERY 4 HOURS PRN
Qty: 1 INHALER | Refills: 0 | Status: SHIPPED | OUTPATIENT
Start: 2019-12-30

## 2019-12-30 NOTE — PROGRESS NOTES
"Janette Mcdonald is a 59 y.o. female.     Chief Complaint   Patient presents with   • URI   • Cough     a lot of drainage, tightness in lungs   • Headache   • Sore Throat     started sat. better now      HPI  New patient to me.  Sudden onset of sore throat, left ear ache, headache that began Saturday. She felt fine when she went to bed on Friday evening.  Thought she was going to get better but then last night had shaking chills and terrible cough. Did not check temp.  Used her albuterol inhaler with relief for chest tightness and wheeze she noticed. Not taken any other meds.  Not coughing today.      Cannot get flu vaccine as she had Guillain[place] syndrome in past.   Social History     Tobacco Use   • Smoking status: Never Smoker   • Smokeless tobacco: Never Used   Substance Use Topics   • Alcohol use: No   • Drug use: No       The following portions of the patient's history were reviewed and updated as appropriate: allergies, current medications, past family history, past medical history, past social history, past surgical history and problem list.    Review of Systems   Constitutional: Positive for activity change, appetite change, chills and fatigue.   HENT: Positive for ear pain (left ear), postnasal drip, rhinorrhea and sore throat. Negative for congestion, sinus pressure and sinus pain.    Respiratory: Positive for cough, chest tightness and wheezing. Negative for shortness of breath.    Cardiovascular: Negative for chest pain and palpitations.   Gastrointestinal: Negative for abdominal pain, diarrhea, nausea and vomiting.   Musculoskeletal: Positive for arthralgias and myalgias.   Neurological: Positive for weakness. Negative for headaches.       Objective   Blood pressure 157/88, pulse 99, temperature 99.6 °F (37.6 °C), temperature source Tympanic, resp. rate 20, height 162.6 cm (64\"), weight 107 kg (236 lb), SpO2 97 %.  Body mass index is 40.51 kg/m².    Physical Exam   Constitutional: She is " oriented to person, place, and time. She appears well-developed and well-nourished. No distress.   HENT:   Head: Normocephalic and atraumatic.   Right Ear: External ear and ear canal normal. Tympanic membrane is not erythematous and not bulging. A middle ear effusion is present.   Left Ear: External ear and ear canal normal. Tympanic membrane is not erythematous and not bulging. A middle ear effusion is present.   Nose: Rhinorrhea present. Right sinus exhibits no maxillary sinus tenderness and no frontal sinus tenderness. Left sinus exhibits no maxillary sinus tenderness and no frontal sinus tenderness.   Mouth/Throat: Posterior oropharyngeal erythema present. No tonsillar exudate.   Eyes: Conjunctivae are normal. Right eye exhibits no discharge. Left eye exhibits no discharge.   Neck: Neck supple.   Cardiovascular: Normal rate and regular rhythm.   Pulmonary/Chest: Effort normal and breath sounds normal.   Abdominal: Soft. Bowel sounds are normal. There is no tenderness.   Musculoskeletal: She exhibits no deformity.   Gait smooth and steady   Lymphadenopathy:     She has no cervical adenopathy.   Neurological: She is alert and oriented to person, place, and time.   Skin: Skin is warm and dry. She is not diaphoretic.   Psychiatric: She has a normal mood and affect.   Nursing note and vitals reviewed.      Assessment   Problem List Items Addressed This Visit     None      Visit Diagnoses     Flu    -  Primary    Relevant Medications    oseltamivir (TAMIFLU) 75 MG capsule    Reactive airway disease, mild intermittent, with acute exacerbation        Relevant Medications    albuterol sulfate  (90 Base) MCG/ACT inhaler           Procedures           Impression and Plan:  Flu was negative-but I suspect she does have the flu.  We discussed options for tx and she would like to try tamiflu as it has helped in the past.  Albuterol inhaler prn.  Have asked her to get flonase-proper technique demonstrated-to help  decongest.  Avoid contact with babies x 5 days.    Signs and symptoms of complications from flu reviewed.  Normal course and expected resolution reviewed.  Lungs sound very good today but we discussed s/s requiring emergent tx such as pneumonia, sinusitis.        Health Maintenance Due   Topic Date Due   • ANNUAL PHYSICAL  01/18/1964   • TDAP/TD VACCINES (1 - Tdap) 01/18/1972   • ZOSTER VACCINE (1 of 2) 01/18/2011   • COLONOSCOPY  01/01/2019   • LIPID PANEL  06/15/2019              EMR Dragon/Transcription disclaimer:   Much of this encounter note is an electronic transcription/translation of spoken language to printed text. The electronic translation of spoken language may permit erroneous, or at times, nonsensical words or phrases to be inadvertently transcribed; Although I have reviewed the note for such errors, some may still exist.

## 2020-02-21 ENCOUNTER — OFFICE VISIT (OUTPATIENT)
Dept: FAMILY MEDICINE CLINIC | Facility: CLINIC | Age: 60
End: 2020-02-21

## 2020-02-21 VITALS
HEIGHT: 64 IN | BODY MASS INDEX: 39.54 KG/M2 | OXYGEN SATURATION: 97 % | RESPIRATION RATE: 16 BRPM | WEIGHT: 231.6 LBS | SYSTOLIC BLOOD PRESSURE: 136 MMHG | DIASTOLIC BLOOD PRESSURE: 100 MMHG | TEMPERATURE: 98.1 F | HEART RATE: 98 BPM

## 2020-02-21 DIAGNOSIS — F32.A DEPRESSION, UNSPECIFIED DEPRESSION TYPE: ICD-10-CM

## 2020-02-21 DIAGNOSIS — Z79.899 MEDICATION MANAGEMENT: ICD-10-CM

## 2020-02-21 DIAGNOSIS — R04.0 BLEEDING FROM THE NOSE: Primary | ICD-10-CM

## 2020-02-21 DIAGNOSIS — Z13.21 ENCOUNTER FOR VITAMIN DEFICIENCY SCREENING: ICD-10-CM

## 2020-02-21 PROCEDURE — 99213 OFFICE O/P EST LOW 20 MIN: CPT | Performed by: NURSE PRACTITIONER

## 2020-02-21 RX ORDER — BUPROPION HYDROCHLORIDE 300 MG/1
TABLET ORAL
Qty: 90 TABLET | Refills: 1 | Status: SHIPPED | OUTPATIENT
Start: 2020-02-21 | End: 2020-05-21 | Stop reason: SDUPTHER

## 2020-02-21 NOTE — PROGRESS NOTES
"Subjective   Rosalie Mcdonald is a 60 y.o. female   who presents for   Chief Complaint   Patient presents with   • Nose Bleed     [id number]       Started 11 days ago, comes and goes, large clots, size of ping pong balls, states she is getting choked on it, last occurrence yesterday  Causing increased anxiety, scared to do any activity, bp is elevated with increased stress and anxiety, usually well controlled  She states she does not have sinus issues, her nose is usually dry, but also reports recent illness in December that lasted over a month  Her face around her nose and mouth/cheeks turned red, this is unusual for her, no previous history of flushing  Taking otc melatonin and sleeping aid  Not taking aspirin therapy  Took accutane in early 20's    /100   Pulse 98   Temp 98.1 °F (36.7 °C)   Resp 16   Ht 162.6 cm (64\")   Wt 105 kg (231 lb 9.6 oz)   SpO2 97%   BMI 39.75 kg/m²       History of Present Illness   Nose Bleed    The bleeding has been from the left nare. This is a new problem. The current episode started 1 to 4 weeks ago (x 11 days). The problem occurs every several days. The problem has been waxing and waning. The bleeding is associated with dry air and recent URI. She has tried pressure for the symptoms. The treatment provided mild relief. Her past medical history is significant for colds and frequent nosebleeds. There is no history of allergies, a bleeding disorder or sinus problems.       The following portions of the patient's history were reviewed and updated as appropriate: allergies, current medications, past family history, past medical history, past social history, past surgical history and problem list.    Review of Systems  Review of Systems   Constitutional: Negative.    HENT: Positive for nosebleeds.    Eyes: Negative.    Respiratory: Negative.    Cardiovascular: Negative.    Gastrointestinal: Negative.    Endocrine: Negative.    Genitourinary: Negative.    Musculoskeletal: " Negative.    Skin: Negative.    Allergic/Immunologic: Negative.    Neurological: Negative.    Hematological: Negative.    Psychiatric/Behavioral: Negative.        Objective   Physical Exam  Physical Exam   Constitutional: She is oriented to person, place, and time. She appears well-developed and well-nourished. No distress.   HENT:   Nose: Sinus tenderness present. No nose lacerations or nasal deformity. No epistaxis.  No foreign bodies.   Cardiovascular: Normal rate, regular rhythm and normal heart sounds. Exam reveals no gallop and no friction rub.   No murmur heard.  Pulmonary/Chest: Effort normal and breath sounds normal. No stridor. No respiratory distress. She has no wheezes. She has no rales.   Neurological: She is alert and oriented to person, place, and time.   Skin: She is not diaphoretic.   Vitals reviewed.        Assessment/Plan   Rosalie was seen today for nose bleed.    Diagnoses and all orders for this visit:    Bleeding from the nose  -     CBC & Differential  -     Protime-INR  -     Ambulatory Referral to ENT (Otolaryngology)    Medication management  -     Comprehensive metabolic panel    Encounter for vitamin deficiency screening  -     Vitamin D 25 hydroxy    Depression, unspecified depression type  -     buPROPion XL (WELLBUTRIN XL) 300 MG 24 hr tablet; TAKE 1 TABLET BY MOUTH EVERY DAY       Labs today  Refill given on wellbutrin, patient has been conserving her medicine because she is almost out  Recommend vaseline in nose, nasal sprays prn, humidifier, discussed dry heat could be causing nose to be irritated  No recent trauma  For acute worsening symptoms, instructed to go to ED  Otherwise, will get established with [facility]. Ent/allergy for further evaluation on Monday

## 2020-02-22 LAB
25(OH)D3+25(OH)D2 SERPL-MCNC: 30.2 NG/ML (ref 30–100)
ALBUMIN SERPL-MCNC: 4.8 G/DL (ref 3.8–4.9)
ALBUMIN/GLOB SERPL: 1.6 {RATIO} (ref 1.2–2.2)
ALP SERPL-CCNC: 149 IU/L (ref 39–117)
ALT SERPL-CCNC: 12 IU/L (ref 0–32)
AST SERPL-CCNC: 14 IU/L (ref 0–40)
BASOPHILS # BLD AUTO: 0 X10E3/UL (ref 0–0.2)
BASOPHILS NFR BLD AUTO: 0 %
BILIRUB SERPL-MCNC: 0.2 MG/DL (ref 0–1.2)
BUN SERPL-MCNC: 18 MG/DL (ref 6–24)
BUN/CREAT SERPL: 24 (ref 9–23)
CALCIUM SERPL-MCNC: 10.6 MG/DL (ref 8.7–10.2)
CHLORIDE SERPL-SCNC: 98 MMOL/L (ref 96–106)
CO2 SERPL-SCNC: 23 MMOL/L (ref 20–29)
CREAT SERPL-MCNC: 0.75 MG/DL (ref 0.57–1)
EOSINOPHIL # BLD AUTO: 0.1 X10E3/UL (ref 0–0.4)
EOSINOPHIL NFR BLD AUTO: 1 %
ERYTHROCYTE [DISTWIDTH] IN BLOOD BY AUTOMATED COUNT: 13.2 % (ref 11.7–15.4)
GLOBULIN SER CALC-MCNC: 3 G/DL (ref 1.5–4.5)
GLUCOSE SERPL-MCNC: 104 MG/DL (ref 65–99)
HCT VFR BLD AUTO: 42.4 % (ref 34–46.6)
HGB BLD-MCNC: 13.9 G/DL (ref 11.1–15.9)
IMM GRANULOCYTES # BLD AUTO: 0 X10E3/UL (ref 0–0.1)
IMM GRANULOCYTES NFR BLD AUTO: 0 %
INR PPP: NORMAL
LYMPHOCYTES # BLD AUTO: 2.9 X10E3/UL (ref 0.7–3.1)
LYMPHOCYTES NFR BLD AUTO: 30 %
MCH RBC QN AUTO: 28.7 PG (ref 26.6–33)
MCHC RBC AUTO-ENTMCNC: 32.8 G/DL (ref 31.5–35.7)
MCV RBC AUTO: 87 FL (ref 79–97)
MONOCYTES # BLD AUTO: 0.5 X10E3/UL (ref 0.1–0.9)
MONOCYTES NFR BLD AUTO: 5 %
NEUTROPHILS # BLD AUTO: 6.1 X10E3/UL (ref 1.4–7)
NEUTROPHILS NFR BLD AUTO: 64 %
PLATELET # BLD AUTO: 432 X10E3/UL (ref 150–450)
POTASSIUM SERPL-SCNC: 4.8 MMOL/L (ref 3.5–5.2)
PROT SERPL-MCNC: 7.8 G/DL (ref 6–8.5)
PROTHROMBIN TIME: NORMAL S
RBC # BLD AUTO: 4.85 X10E6/UL (ref 3.77–5.28)
SODIUM SERPL-SCNC: 140 MMOL/L (ref 134–144)
SPECIMEN STATUS: NORMAL
WBC # BLD AUTO: 9.7 X10E3/UL (ref 3.4–10.8)

## 2020-02-24 PROCEDURE — 99284 EMERGENCY DEPT VISIT MOD MDM: CPT

## 2020-02-25 ENCOUNTER — HOSPITAL ENCOUNTER (EMERGENCY)
Facility: HOSPITAL | Age: 60
Discharge: HOME | End: 2020-02-25
Attending: EMERGENCY MEDICINE | Admitting: EMERGENCY MEDICINE

## 2020-02-25 VITALS
SYSTOLIC BLOOD PRESSURE: 117 MMHG | BODY MASS INDEX: 40.29 KG/M2 | TEMPERATURE: 98.1 F | HEIGHT: 64 IN | DIASTOLIC BLOOD PRESSURE: 73 MMHG | HEART RATE: 79 BPM | WEIGHT: 236 LBS | OXYGEN SATURATION: 92 % | RESPIRATION RATE: 18 BRPM

## 2020-02-25 DIAGNOSIS — R04.0 EPISTAXIS: Primary | ICD-10-CM

## 2020-02-25 PROCEDURE — 63710000001 ONDANSETRON ODT 4 MG TABLET DISPERSIBLE: Performed by: EMERGENCY MEDICINE

## 2020-02-25 RX ORDER — ONDANSETRON 4 MG/1
4 TABLET, FILM COATED ORAL EVERY 8 HOURS PRN
Qty: 10 TABLET | Refills: 0 | Status: SHIPPED | OUTPATIENT
Start: 2020-02-25 | End: 2021-03-19

## 2020-02-25 RX ORDER — CLONIDINE HYDROCHLORIDE 0.1 MG/1
0.1 TABLET ORAL ONCE
Status: COMPLETED | OUTPATIENT
Start: 2020-02-25 | End: 2020-02-25

## 2020-02-25 RX ORDER — CLONIDINE HYDROCHLORIDE 0.1 MG/1
0.1 TABLET ORAL 2 TIMES DAILY PRN
Qty: 10 TABLET | Refills: 0 | Status: SHIPPED | OUTPATIENT
Start: 2020-02-25 | End: 2021-03-19

## 2020-02-25 RX ORDER — ONDANSETRON 4 MG/1
4 TABLET, ORALLY DISINTEGRATING ORAL ONCE
Status: COMPLETED | OUTPATIENT
Start: 2020-02-25 | End: 2020-02-25

## 2020-02-25 RX ADMIN — Medication 2 SPRAY: at 01:22

## 2020-02-25 RX ADMIN — ONDANSETRON 4 MG: 4 TABLET, ORALLY DISINTEGRATING ORAL at 01:31

## 2020-02-25 RX ADMIN — CLONIDINE HYDROCHLORIDE 0.1 MG: 0.1 TABLET ORAL at 01:35

## 2020-05-21 DIAGNOSIS — F32.A DEPRESSION, UNSPECIFIED DEPRESSION TYPE: ICD-10-CM

## 2020-05-22 RX ORDER — BUPROPION HYDROCHLORIDE 300 MG/1
TABLET ORAL
Qty: 90 TABLET | Refills: 1 | Status: SHIPPED | OUTPATIENT
Start: 2020-05-22 | End: 2021-06-18

## 2021-03-19 ENCOUNTER — OFFICE VISIT (OUTPATIENT)
Dept: FAMILY MEDICINE CLINIC | Facility: CLINIC | Age: 61
End: 2021-03-19

## 2021-03-19 VITALS
RESPIRATION RATE: 16 BRPM | DIASTOLIC BLOOD PRESSURE: 98 MMHG | TEMPERATURE: 96.9 F | SYSTOLIC BLOOD PRESSURE: 148 MMHG | HEIGHT: 64 IN | WEIGHT: 222.4 LBS | OXYGEN SATURATION: 99 % | HEART RATE: 69 BPM | BODY MASS INDEX: 37.97 KG/M2

## 2021-03-19 DIAGNOSIS — N30.00 ACUTE CYSTITIS WITHOUT HEMATURIA: ICD-10-CM

## 2021-03-19 DIAGNOSIS — N30.00 ACUTE CYSTITIS WITHOUT HEMATURIA: Primary | ICD-10-CM

## 2021-03-19 LAB
BILIRUB BLD-MCNC: NEGATIVE MG/DL
CLARITY, POC: CLEAR
COLOR UR: YELLOW
GLUCOSE UR STRIP-MCNC: NEGATIVE MG/DL
KETONES UR QL: NEGATIVE
LEUKOCYTE EST, POC: ABNORMAL
NITRITE UR-MCNC: NEGATIVE MG/ML
PH UR: 7 [PH] (ref 5–8)
PROT UR STRIP-MCNC: NEGATIVE MG/DL
RBC # UR STRIP: NEGATIVE /UL
SP GR UR: 1.02 (ref 1–1.03)
UROBILINOGEN UR QL: NORMAL

## 2021-03-19 PROCEDURE — 99213 OFFICE O/P EST LOW 20 MIN: CPT | Performed by: NURSE PRACTITIONER

## 2021-03-19 PROCEDURE — 81003 URINALYSIS AUTO W/O SCOPE: CPT | Performed by: NURSE PRACTITIONER

## 2021-03-19 RX ORDER — PHENAZOPYRIDINE HYDROCHLORIDE 200 MG/1
200 TABLET, FILM COATED ORAL 3 TIMES DAILY PRN
Qty: 6 TABLET | Refills: 0 | Status: SHIPPED | OUTPATIENT
Start: 2021-03-19 | End: 2021-07-26

## 2021-03-19 RX ORDER — ALBUTEROL SULFATE 90 UG/1
2 AEROSOL, METERED RESPIRATORY (INHALATION) EVERY 4 HOURS PRN
Status: CANCELLED | OUTPATIENT
Start: 2021-03-19

## 2021-03-19 RX ORDER — NITROFURANTOIN 25; 75 MG/1; MG/1
100 CAPSULE ORAL 2 TIMES DAILY
Qty: 14 CAPSULE | Refills: 0 | Status: SHIPPED | OUTPATIENT
Start: 2021-03-19 | End: 2021-04-06

## 2021-03-19 NOTE — PROGRESS NOTES
"Chief Complaint  Urinary Tract Infection (possible x3wks)    Subjective          Rosalie Mcdonald presents to [facility]  Symptoms for UTI x 3 weeks, at onset dysuria, bladder spasms, tested at home,   Started after sexual intercourse,   Used otc cranberry juice/pills, took older antibiotics, amoxicillin, completed over a week ago    Reports popping in multiple joints, sudden onset, not necessarily painful  Activity waxes and wanes, will walk 2.4 miles nightly for a while, then not do anything      Urinary Tract Infection   This is a new problem. The current episode started 1 to 4 weeks ago. The problem occurs every urination. The problem has been gradually worsening. The quality of the pain is described as aching and burning. The pain is mild. There has been no fever. Associated symptoms include frequency and urgency. Pertinent negatives include no chills, discharge, flank pain, hematuria, hesitancy, nausea, sweats or vomiting. She has tried increased fluids (cranberry juice) for the symptoms. The treatment provided mild relief.       Objective   Vital Signs:   /98   Pulse 69   Temp 96.9 °F (36.1 °C) (Temporal)   Resp 16   Ht 162.6 cm (64\")   Wt 101 kg (222 lb 6.4 oz)   SpO2 99%   BMI 38.17 kg/m²     Physical Exam  Vitals reviewed.   Constitutional:       General: She is not in acute distress.     Appearance: She is well-developed. She is not diaphoretic.   Cardiovascular:      Rate and Rhythm: Normal rate and regular rhythm.      Heart sounds: Normal heart sounds. No murmur heard.   No friction rub. No gallop.    Pulmonary:      Effort: Pulmonary effort is normal. No respiratory distress.      Breath sounds: Normal breath sounds. No wheezing or rales.   Abdominal:      General: Bowel sounds are normal. There is no distension.      Palpations: Abdomen is soft.      Tenderness: There is no abdominal tenderness.   Musculoskeletal:      Cervical back: Neck supple.   Skin:     " General: Skin is warm and dry.   Neurological:      Mental Status: She is alert and oriented to person, place, and time.        Result Review :                 Assessment and Plan    Diagnoses and all orders for this visit:    1. Acute cystitis without hematuria (Primary)  -     POC Urinalysis Dipstick, Automated  -     nitrofurantoin, macrocrystal-monohydrate, (Macrobid) 100 MG capsule; Take 1 capsule by mouth 2 (Two) Times a Day.  Dispense: 14 capsule; Refill: 0  -     phenazopyridine (Pyridium) 200 MG tablet; Take 1 tablet by mouth 3 (Three) Times a Day As Needed for Bladder Spasms.  Dispense: 6 tablet; Refill: 0  -     Chlamydia trachomatis, Neisseria gonorrhoeae, PCR - Urine, Urine, Clean Catch; Future    Other orders  -     Cancel: albuterol sulfate  (90 Base) MCG/ACT inhaler; Inhale 2 puffs Every 4 (Four) Hours As Needed for Wheezing or Shortness of Air (2 puffs q 4 hr prn).        Follow Up   No follow-ups on file.  Patient was given instructions and counseling regarding her condition or for health maintenance advice. Please see specific information pulled into the AVS if appropriate.     Start macrobid and pyridium as directed  Will check urine culture and STI testing at patient request    Does have increased joint popping, recommend to increase activity, follow up with PCP for continued symptoms

## 2021-03-22 ENCOUNTER — BULK ORDERING (OUTPATIENT)
Dept: CASE MANAGEMENT | Facility: OTHER | Age: 61
End: 2021-03-22

## 2021-03-22 DIAGNOSIS — Z23 IMMUNIZATION DUE: ICD-10-CM

## 2021-03-22 LAB
C TRACH RRNA SPEC QL NAA+PROBE: NEGATIVE
N GONORRHOEA RRNA SPEC QL NAA+PROBE: NEGATIVE

## 2021-04-06 ENCOUNTER — TELEPHONE (OUTPATIENT)
Dept: FAMILY MEDICINE CLINIC | Facility: CLINIC | Age: 61
End: 2021-04-06

## 2021-04-06 RX ORDER — SULFAMETHOXAZOLE AND TRIMETHOPRIM 400; 80 MG/1; MG/1
1 TABLET ORAL 2 TIMES DAILY
Qty: 10 TABLET | Refills: 0 | Status: SHIPPED | OUTPATIENT
Start: 2021-04-06 | End: 2021-07-26

## 2021-04-06 NOTE — TELEPHONE ENCOUNTER
Left detailed VM for this patient to call this nurse back.     HUB transfer this patient to the office to speak with this nurse.

## 2021-04-06 NOTE — TELEPHONE ENCOUNTER
PATIENT CALLING IN REGARDS TO SPEAK WITH DR CRUZ ABOUT HER UTI SYMPTOMS NOT GOING AWAY AFTER PRESCRIPTION.  PLEASE ADVISE, THANK YOU!

## 2021-04-06 NOTE — TELEPHONE ENCOUNTER
Spoke with this patient today. Pt  was seen by Danielito on 3/21. Was given Macrobid to take.  took all the tablets as ordered. After about 2 days passed completion of the antibiotic she peed on an azo stick that stated she was starting to have some changes to her urine.  A day or two later she did it ago and the stick read that she had bacteria infection. Pt states that she is symptomatic at this time. She is having bladder spasmism and pain. States symptoms have been present for about a week now. States can empty bladder she thinks,denies urgency but does have frequency. Pt  is asking for another round of abx therapy. Please send to [facility] in [facility] if appropriate.

## 2021-04-06 NOTE — PROGRESS NOTES
Spoke with patient about her symptoms.  Prior to 3/19/2021 she had been having what she described as bladder spasms and significant pain at the end of voiding her urine.  She had very old amoxicillin at home in short supply which she took with some mild improvement of her symptoms.  She was seen in the office and her urine was tested.  UA at that time only positive for trace leukocytes.  There was no culture done.  She was given 7 days of Macrobid.  Patient reported that after couple days of the antibiotic she did have some improvement of her symptoms.  She was not having nearly the same amount of pain at the end of her voids.  She has urine testing strips from over-the-counter and has been monitoring these along the way.  She says that at the end of the antibiotic treatment that her urine test strip was very mildly purple look color change consistent with the presence of leukocytes.  The test also looks for nitrates and this has been negative with each test the patient takes.  She reports that a few days after the antibiotics have been completed she started having more pain and bladder spasm especially at the end of voiding her urine.  She stopped taking Pyridium because she wanted to monitor her spasms and make sure she was really treated and better.  2 days ago she experienced nausea and lower back pain which gave her concern for having recurrence or not complete improvement of her UTI.  She says her nausea has resolved and her lower back pain is better as well.  Her urine testing strip at that time was dark purple again like it had been prior to coming into the office.  We talked about her symptoms and her already being treated with 2 antibiotics.  Patient was concerned that she was not treated long enough of the antibiotic but I counseled her that typically with female UTIs we do not have to generally treat for a whole week and she actually got a longer treatment than is usually necessary.  I also am concerned  that with a treated urine that were not can to get great results on a UA and culture.  I expressed concerned over using more antibiotics and we need to as well as missing evaluation for another problem that causes her urinary symptoms that would need to be evaluated with a urologist.  She voiced understanding of this.  She has had as well significant improvement of her bladder spasms and pain at the end of voiding to be just slight this morning.  I requested that she give it the rest of the day and to tomorrow to monitor her symptoms.  If they do worsen I have sent an antibiotic to the pharmacy.  If she does take this antibiotic and has recurrence of her symptoms I told her that she should absolutely let me know and I would recommend evaluation with a urologist.  She is understanding and agreeable to this plan.

## 2021-06-18 DIAGNOSIS — F32.A DEPRESSION, UNSPECIFIED DEPRESSION TYPE: ICD-10-CM

## 2021-06-18 RX ORDER — BUPROPION HYDROCHLORIDE 300 MG/1
TABLET ORAL
Qty: 90 TABLET | Refills: 1 | Status: SHIPPED | OUTPATIENT
Start: 2021-06-18 | End: 2022-09-27

## 2021-07-26 ENCOUNTER — HOSPITAL ENCOUNTER (OUTPATIENT)
Dept: GENERAL RADIOLOGY | Facility: HOSPITAL | Age: 61
Discharge: HOME | End: 2021-07-26

## 2021-07-26 ENCOUNTER — OFFICE VISIT (OUTPATIENT)
Dept: FAMILY MEDICINE CLINIC | Facility: CLINIC | Age: 61
End: 2021-07-26

## 2021-07-26 VITALS
TEMPERATURE: 97.5 F | WEIGHT: 226.9 LBS | HEIGHT: 64 IN | OXYGEN SATURATION: 100 % | SYSTOLIC BLOOD PRESSURE: 167 MMHG | HEART RATE: 68 BPM | BODY MASS INDEX: 38.74 KG/M2 | DIASTOLIC BLOOD PRESSURE: 96 MMHG

## 2021-07-26 DIAGNOSIS — M25.512 ACUTE PAIN OF LEFT SHOULDER: Primary | ICD-10-CM

## 2021-07-26 DIAGNOSIS — M25.512 ARTHRALGIA OF LEFT ACROMIOCLAVICULAR JOINT: ICD-10-CM

## 2021-07-26 DIAGNOSIS — M54.2 NECK PAIN: ICD-10-CM

## 2021-07-26 DIAGNOSIS — M25.512 ACUTE PAIN OF LEFT SHOULDER: ICD-10-CM

## 2021-07-26 PROCEDURE — 72040 X-RAY EXAM NECK SPINE 2-3 VW: CPT

## 2021-07-26 PROCEDURE — 99214 OFFICE O/P EST MOD 30 MIN: CPT | Performed by: NURSE PRACTITIONER

## 2021-07-26 PROCEDURE — 73000 X-RAY EXAM OF COLLAR BONE: CPT

## 2021-07-26 PROCEDURE — 73030 X-RAY EXAM OF SHOULDER: CPT

## 2021-07-26 RX ORDER — CYCLOBENZAPRINE HCL 5 MG
5 TABLET ORAL NIGHTLY PRN
Qty: 30 TABLET | Refills: 0 | Status: SHIPPED | OUTPATIENT
Start: 2021-07-26 | End: 2021-10-27

## 2021-07-26 NOTE — PROGRESS NOTES
"Chief Complaint  Arm Pain (c/o pain in L arm and L shoulder getting worse)    Subjective          Rosalie Mcdonald presents to [facility]  History of Present Illness Patient known to me for previous acute visit.  Today is new problem.      Here for couple of months of left shoulder pain.  Started in left clavicle and thought she felt lump there. Shoulder hurts all the time.  Movement causes pain, trouble reaching behind to fasten bra.  Has been worsening and is now having radiculopathy down her left arm.  Not really taking anything as she does not like to take meds.  Has high pain tolerance.     Sometimes has pain in her elbow that feels like nerve pain-\"shocks\".     Has been lifting weights at gym.  Shoulder hurts at night when trying to sleep-trouble getting comfortable.  No recent injuries.  Does admit she fell awhile ago while trying to clean ceiling fan but did not hurt herself.      Has PMH C spine surgery.  No recent neck pain, injuries.      She has had to get Q 6 month imaging on left breast for abnl and worries about cancer.      Had FM die unexpectantly at young age when sx were treated but no diagnosis made and she understandably wants to make sure she knows what is wrong and not just treating sx.     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      She has also noticed loud cracking or popping in joints of LE but will defer w/u for this until she sees her PCP.         Objective   Vital Signs:   /96   Pulse 68   Temp 97.5 °F (36.4 °C)   Ht 161.3 cm (63.5\")   Wt 103 kg (226 lb 14.4 oz)   SpO2 100%   BMI 39.56 kg/m²     Physical Exam  Vitals and nursing note reviewed.   Constitutional:       General: She is not in acute distress.     Appearance: She is well-developed. She is not ill-appearing or diaphoretic.   HENT:      Head: Normocephalic and atraumatic.   Eyes:      General:         Right eye: No discharge.         Left eye: " No discharge.      Conjunctiva/sclera: Conjunctivae normal.   Cardiovascular:      Rate and Rhythm: Normal rate and regular rhythm.      Heart sounds: Normal heart sounds.   Pulmonary:      Effort: Pulmonary effort is normal.      Breath sounds: Normal breath sounds.   Abdominal:      General: Bowel sounds are normal.      Palpations: Abdomen is soft.      Tenderness: There is no abdominal tenderness.   Musculoskeletal:         General: No deformity.      Left shoulder: No deformity or crepitus. Normal strength. Normal pulse.      Cervical back: Neck supple. Spasms and tenderness present. No bony tenderness. No pain with movement.      Comments: Passive ROM left shoulder almost to normal range, decreased to 90 extension active    TTP at AC joint, no clavicular TTP or abnl noted.      Pt does report mild pain with internal rotation    Left trapezius muscles TTP and very tight. Has TTP muscles  down to mid medial scapula    Lymphadenopathy:      Cervical: No cervical adenopathy.   Skin:     General: Skin is warm and dry.   Neurological:      General: No focal deficit present.      Mental Status: She is alert and oriented to person, place, and time.   Psychiatric:         Mood and Affect: Mood normal.         Behavior: Behavior normal.        Result Review :                 Assessment and Plan    Diagnoses and all orders for this visit:    1. Acute pain of left shoulder (Primary)  -     XR shoulder 2+ vw left; Future  -     XR clavicle left; Future    2. Arthralgia of left acromioclavicular joint  -     XR shoulder 2+ vw left; Future  -     XR clavicle left; Future    3. Neck pain  -     XR Spine Cervical 2 or 3 View; Future  -     cyclobenzaprine (FLEXERIL) 5 MG tablet; Take 1 tablet by mouth At Night As Needed for Muscle Spasms.  Dispense: 30 tablet; Refill: 0      I think most of her pain is coming from her neck and related to muscles.  She may have some AC joint arthritis, possible rotator cuff tendinopathy.  Pt is  anxious to find cause of her sx and not just treat symptomatically.     BP is high today-she thinks it is due to her current sx. Recheck was improved.      Recommend f/u with PCP.           Follow Up   Return if symptoms worsen or fail to improve.  Patient was given instructions and counseling regarding her condition or for health maintenance advice. Please see specific information pulled into the AVS if appropriate.

## 2021-07-27 ENCOUNTER — TELEPHONE (OUTPATIENT)
Dept: FAMILY MEDICINE CLINIC | Facility: CLINIC | Age: 61
End: 2021-07-27

## 2021-07-27 NOTE — TELEPHONE ENCOUNTER
Caller: Rosalie Mcdonald    Relationship: Self    Best call back number: [phone]     Caller requesting test results: YES    What test was performed: X RAY    When was the test performed: 07/26/21    Where was the test performed: IN OFFICE    Additional notes: PATIENT RECEIVED CALL ABOUT A REFERRAL FOR SPORTS MEDICINE AND WASN'T SURE IF DR. CRUZ HAD REQUESTED IT

## 2021-08-09 ENCOUNTER — OFFICE VISIT (OUTPATIENT)
Dept: SPORTS MEDICINE | Facility: CLINIC | Age: 61
End: 2021-08-09

## 2021-08-09 VITALS
TEMPERATURE: 98 F | RESPIRATION RATE: 16 BRPM | WEIGHT: 226 LBS | DIASTOLIC BLOOD PRESSURE: 70 MMHG | SYSTOLIC BLOOD PRESSURE: 128 MMHG | HEART RATE: 92 BPM | OXYGEN SATURATION: 96 % | HEIGHT: 64 IN | BODY MASS INDEX: 38.58 KG/M2

## 2021-08-09 DIAGNOSIS — M25.50 ARTHRALGIA OF MULTIPLE SITES, BILATERAL: ICD-10-CM

## 2021-08-09 DIAGNOSIS — M19.012 ARTHRITIS OF LEFT ACROMIOCLAVICULAR JOINT: ICD-10-CM

## 2021-08-09 DIAGNOSIS — M54.12 CERVICAL RADICULITIS: ICD-10-CM

## 2021-08-09 DIAGNOSIS — M75.42 IMPINGEMENT SYNDROME OF LEFT SHOULDER: Primary | ICD-10-CM

## 2021-08-09 DIAGNOSIS — M54.42 CHRONIC BILATERAL LOW BACK PAIN WITH BILATERAL SCIATICA: ICD-10-CM

## 2021-08-09 DIAGNOSIS — Z98.890 H/O CERVICAL SPINE SURGERY: ICD-10-CM

## 2021-08-09 DIAGNOSIS — G89.29 CHRONIC BILATERAL LOW BACK PAIN WITH BILATERAL SCIATICA: ICD-10-CM

## 2021-08-09 DIAGNOSIS — M54.41 CHRONIC BILATERAL LOW BACK PAIN WITH BILATERAL SCIATICA: ICD-10-CM

## 2021-08-09 PROCEDURE — 99214 OFFICE O/P EST MOD 30 MIN: CPT | Performed by: FAMILY MEDICINE

## 2021-08-09 NOTE — PROGRESS NOTES
"Chief Complaint  Shoulder Pain (referral from RIMMA Santoro for LT shoulder pain with limited ROM with numbness and tingling - difficulty lifting things, NKI - pain has been chronic with recent worsening - here today for further evaluation and treatment - xrays done on 07/26/2021 of shoulder, clavicle, and c-spine )    Subjective          Rosalie Mcdonald presents to [facility]  History of Present Illness  1.  For the past 3 months patient has been having left shoulder region pain.  No known trauma.  Pain is worse with movement of the shoulder however she is also having clavicular pain, superior medial scapular pain, paresthesias into her left hand.  She has noticed some decreased range of motion and subjective weakness that is periodic in nature both hands.  Patient has had previous cervical spine surgery for spinal stenosis in 2015.  2.  Patient also having bilateral migratory joint pain without swelling.  3.  For about the same amount of time patient has been having bilateral lower back pain with getting pain and paresthesias in the legs.  4.  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  Had her last COVID-19 vaccine on 6/15/2021.    Objective   Vital Signs:   /70 (BP Location: Left arm, Patient Position: Sitting, Cuff Size: Adult)   Pulse 92   Temp 98 °F (36.7 °C) (Temporal)   Resp 16   Ht 161.3 cm (63.5\")   Wt 103 kg (226 lb)   SpO2 96%   BMI 39.40 kg/m²     Physical Exam  Vitals reviewed.   Constitutional:       Appearance: She is well-developed.   HENT:      Head: Normocephalic and atraumatic.   Eyes:      Conjunctiva/sclera: Conjunctivae normal.      Pupils: Pupils are equal, round, and reactive to light.   Cardiovascular:      Comments: No peripheral edema  Pulmonary:      Effort: Pulmonary effort is normal.   Musculoskeletal:      Comments: With mild limitation flexion extension of the cervical spine.  Equivocal [place] bilaterally.    Left shoulder with " positive Neer and Garza, mild limitation of external rotation.  Positive empty can.  Negative drop arm.  Negative [place].  Motor today seems 4 out of 5 and limited by pain.    Low back with equivocal straight leg raises.       Skin:     General: Skin is warm and dry.   Neurological:      Mental Status: She is alert and oriented to person, place, and time.   Psychiatric:         Behavior: Behavior normal.        Result Review :                XR Clavicle Left (07/26/2021 13:36)  XR Shoulder 2+ View Left (07/26/2021 13:39)  XR Spine Cervical 2 or 3 View (07/26/2021 13:39)  Progress Notes by Roman Green MD (07/06/2016 12:59)  Op Note by Roman Green MD (11/06/2015 00:00)    Assessment and Plan    Diagnoses and all orders for this visit:    1. Impingement syndrome of left shoulder (Primary)    2. Arthritis of left acromioclavicular joint    3. H/O cervical spine surgery  -     MRI Cervical Spine With Contrast; Future    4. Chronic bilateral low back pain with bilateral sciatica  -     MRI Lumbar Spine Without Contrast; Future    5. Cervical radiculitis  -     MRI Cervical Spine With Contrast; Future    6. Arthralgia of multiple sites, bilateral  -     Rheumatoid Arthritis (RA) Profile  -     LUIS F With / DsDNA, RNP, Sjogrens A / B, Taylor  -     TSH  -     T4, Free  -     Comprehensive Metabolic Panel  -     CBC & Differential      1.  Her left shoulder region pain at least by exam appears to be impingement, but because of the paresthesias and previous neck surgery is possible she could be having cervical radiculitis.  We will try to get MRI cervical spine.  Have offered prednisone orally for now until scans are done, patient defers medication at this point.  2.  For her multiple joint pains and popping we will check the above labs.    Follow-up after MRIs.    Follow Up   No follow-ups on file.  Patient was given instructions and counseling regarding her condition or for health maintenance advice. Please see  specific information pulled into the AVS if appropriate.

## 2021-08-11 LAB
ALBUMIN SERPL-MCNC: 4.2 G/DL (ref 3.5–5.2)
ALBUMIN/GLOB SERPL: 1.5 G/DL
ALP SERPL-CCNC: 130 U/L (ref 39–117)
ALT SERPL-CCNC: 10 U/L (ref 1–33)
ANA SER QL: NEGATIVE
AST SERPL-CCNC: 15 U/L (ref 1–32)
BASOPHILS # BLD AUTO: 0.05 10*3/MM3 (ref 0–0.2)
BASOPHILS NFR BLD AUTO: 0.6 % (ref 0–1.5)
BILIRUB SERPL-MCNC: 0.2 MG/DL (ref 0–1.2)
BUN SERPL-MCNC: 18 MG/DL (ref 8–23)
BUN/CREAT SERPL: 26.9 (ref 7–25)
CALCIUM SERPL-MCNC: 10.3 MG/DL (ref 8.6–10.5)
CCP IGA+IGG SERPL IA-ACNC: 8 UNITS (ref 0–19)
CHLORIDE SERPL-SCNC: 102 MMOL/L (ref 98–107)
CO2 SERPL-SCNC: 28.3 MMOL/L (ref 22–29)
CREAT SERPL-MCNC: 0.67 MG/DL (ref 0.57–1)
EOSINOPHIL # BLD AUTO: 0.1 10*3/MM3 (ref 0–0.4)
EOSINOPHIL NFR BLD AUTO: 1.1 % (ref 0.3–6.2)
ERYTHROCYTE [DISTWIDTH] IN BLOOD BY AUTOMATED COUNT: 12.7 % (ref 12.3–15.4)
GLOBULIN SER CALC-MCNC: 2.8 GM/DL
GLUCOSE SERPL-MCNC: 80 MG/DL (ref 65–99)
HCT VFR BLD AUTO: 41.4 % (ref 34–46.6)
HGB BLD-MCNC: 13.4 G/DL (ref 12–15.9)
IMM GRANULOCYTES # BLD AUTO: 0.04 10*3/MM3 (ref 0–0.05)
IMM GRANULOCYTES NFR BLD AUTO: 0.4 % (ref 0–0.5)
LYMPHOCYTES # BLD AUTO: 2.74 10*3/MM3 (ref 0.7–3.1)
LYMPHOCYTES NFR BLD AUTO: 30.2 % (ref 19.6–45.3)
MCH RBC QN AUTO: 29.1 PG (ref 26.6–33)
MCHC RBC AUTO-ENTMCNC: 32.4 G/DL (ref 31.5–35.7)
MCV RBC AUTO: 89.8 FL (ref 79–97)
MONOCYTES # BLD AUTO: 0.64 10*3/MM3 (ref 0.1–0.9)
MONOCYTES NFR BLD AUTO: 7.1 % (ref 5–12)
NEUTROPHILS # BLD AUTO: 5.5 10*3/MM3 (ref 1.7–7)
NEUTROPHILS NFR BLD AUTO: 60.6 % (ref 42.7–76)
NRBC BLD AUTO-RTO: 0 /100 WBC (ref 0–0.2)
PLATELET # BLD AUTO: 344 10*3/MM3 (ref 140–450)
POTASSIUM SERPL-SCNC: 4.9 MMOL/L (ref 3.5–5.2)
PROT SERPL-MCNC: 7 G/DL (ref 6–8.5)
RBC # BLD AUTO: 4.61 10*6/MM3 (ref 3.77–5.28)
RHEUMATOID FACT SERPL-ACNC: <10 IU/ML (ref 0–13.9)
SODIUM SERPL-SCNC: 142 MMOL/L (ref 136–145)
T4 FREE SERPL-MCNC: 1.49 NG/DL (ref 0.93–1.7)
TSH SERPL DL<=0.005 MIU/L-ACNC: 3.34 UIU/ML (ref 0.27–4.2)
WBC # BLD AUTO: 9.07 10*3/MM3 (ref 3.4–10.8)

## 2021-09-05 ENCOUNTER — HOSPITAL ENCOUNTER (OUTPATIENT)
Dept: MRI IMAGING | Facility: HOSPITAL | Age: 61
End: 2021-09-05

## 2021-10-27 DIAGNOSIS — M54.2 NECK PAIN: ICD-10-CM

## 2021-10-27 RX ORDER — CYCLOBENZAPRINE HCL 5 MG
5 TABLET ORAL NIGHTLY PRN
Qty: 30 TABLET | Refills: 0 | Status: SHIPPED | OUTPATIENT
Start: 2021-10-27

## 2021-10-27 NOTE — TELEPHONE ENCOUNTER
Rx Refill Note  Requested Prescriptions     Pending Prescriptions Disp Refills   • cyclobenzaprine (FLEXERIL) 5 MG tablet [Pharmacy Med Name: CYCLOBENZAPRINE 5MG TABLETS] 30 tablet 0     Sig: TAKE 1 TABLET BY MOUTH AT NIGHT AS NEEDED FOR MUSCLE SPASMS      Last office visit with prescribing clinician: 01/28/2019  Next office visit with prescribing clinician: 11/19/21           Tanna Sewell MA  10/27/21, 11:18 EDT

## 2021-11-16 ENCOUNTER — TELEPHONE (OUTPATIENT)
Dept: SPORTS MEDICINE | Facility: CLINIC | Age: 61
End: 2021-11-16

## 2021-11-16 NOTE — TELEPHONE ENCOUNTER
Patient has a referral for an MRI that was placed in August by you. She has canceled her appointment twice due to quarantine. Since then, the scheduling team has attempted to contact her 3 times. I just tried again to contact her again and was unable to reach her so I did leave her a message to call me back here at the office so I can get an update.   What would you like to do? Close the referral or send a letter?   Please advise, thank you.

## 2021-11-23 NOTE — TELEPHONE ENCOUNTER
Letter has been typed and placed in the mail. Documented in the referral and will defer one month.   Kimberlyn

## 2021-12-03 ENCOUNTER — TELEPHONE (OUTPATIENT)
Dept: FAMILY MEDICINE CLINIC | Facility: CLINIC | Age: 61
End: 2021-12-03

## 2021-12-03 NOTE — TELEPHONE ENCOUNTER
Caller: Rosalie Mcdonald    Relationship to patient: Self    Best call back number: [phone]    Patient is needing: PT IS CALLING TO ASK IF SHE CAN RECEIVE T DAP SHOT IN OFFICE. ATTEMPTED TO WT, NO ANSWER

## 2021-12-03 NOTE — TELEPHONE ENCOUNTER
Called patient and left VM advising that she was ok to have a Tdap is not had one. Told this patient that she could have this done her in the office and needs to have an appt.

## 2021-12-06 ENCOUNTER — TELEPHONE (OUTPATIENT)
Dept: FAMILY MEDICINE CLINIC | Facility: CLINIC | Age: 61
End: 2021-12-06

## 2021-12-06 NOTE — TELEPHONE ENCOUNTER
Hub staff attempted to follow warm transfer process and was unsuccessful     Caller: Rosalie Mcdonald    Relationship to patient: Self    Best call back number: [phone]    Patient is needing: PATIENT IS NEEDING TO SCHEDULE FOR A T-DAP VACCINE. CAN THIS APPOINTMENT BE MADE ASAP PLEASE.

## 2021-12-07 ENCOUNTER — CLINICAL SUPPORT (OUTPATIENT)
Dept: FAMILY MEDICINE CLINIC | Facility: CLINIC | Age: 61
End: 2021-12-07

## 2021-12-07 DIAGNOSIS — Z23 NEED FOR VACCINATION: Primary | ICD-10-CM

## 2021-12-07 PROCEDURE — 90715 TDAP VACCINE 7 YRS/> IM: CPT | Performed by: FAMILY MEDICINE

## 2021-12-07 PROCEDURE — 90471 IMMUNIZATION ADMIN: CPT | Performed by: FAMILY MEDICINE

## 2022-09-26 DIAGNOSIS — F32.A DEPRESSION, UNSPECIFIED DEPRESSION TYPE: ICD-10-CM

## 2022-09-27 RX ORDER — BUPROPION HYDROCHLORIDE 300 MG/1
TABLET ORAL
Qty: 30 TABLET | Refills: 0 | Status: SHIPPED | OUTPATIENT
Start: 2022-09-27 | End: 2023-02-10 | Stop reason: SDUPTHER

## 2022-09-27 NOTE — TELEPHONE ENCOUNTER
Rx Refill Note  Requested Prescriptions     Pending Prescriptions Disp Refills   • buPROPion XL (WELLBUTRIN XL) 300 MG 24 hr tablet [Pharmacy Med Name: BUPROPION XL 300MG TABLETS] 90 tablet 1     Sig: TAKE 1 TABLET BY MOUTH EVERY DAY      Last office visit with prescribing clinician: 3/19/2021      Next office visit with prescribing clinician: Visit date not found       {TIP  Please add Last Relevant Lab Date if appropriate: 08/09/21    Harmony Carbajal MA  09/27/22, 09:19 EDT

## 2022-12-20 ENCOUNTER — OFFICE VISIT (OUTPATIENT)
Dept: FAMILY MEDICINE CLINIC | Facility: CLINIC | Age: 62
End: 2022-12-20

## 2022-12-20 VITALS
SYSTOLIC BLOOD PRESSURE: 136 MMHG | HEIGHT: 64 IN | RESPIRATION RATE: 14 BRPM | WEIGHT: 223.4 LBS | OXYGEN SATURATION: 96 % | BODY MASS INDEX: 38.14 KG/M2 | TEMPERATURE: 97.8 F | DIASTOLIC BLOOD PRESSURE: 68 MMHG | HEART RATE: 96 BPM

## 2022-12-20 DIAGNOSIS — J03.00 STREPTOCOCCAL TONSILLITIS: Primary | ICD-10-CM

## 2022-12-20 LAB
EXPIRATION DATE: ABNORMAL
INTERNAL CONTROL: ABNORMAL
Lab: ABNORMAL
S PYO AG THROAT QL: POSITIVE

## 2022-12-20 PROCEDURE — 87880 STREP A ASSAY W/OPTIC: CPT | Performed by: NURSE PRACTITIONER

## 2022-12-20 PROCEDURE — 99214 OFFICE O/P EST MOD 30 MIN: CPT | Performed by: NURSE PRACTITIONER

## 2022-12-20 RX ORDER — AMOXICILLIN 500 MG/1
500 CAPSULE ORAL 3 TIMES DAILY
Qty: 30 CAPSULE | Refills: 0 | Status: SHIPPED | OUTPATIENT
Start: 2022-12-20 | End: 2023-03-02

## 2022-12-20 NOTE — PATIENT INSTRUCTIONS
Discharge instructions plenty of fluids, you can take Tylenol or ibuprofen 600 mg 3 times a day for pain or swelling, okay to take 803 times a day temporarily if need be  Chloraseptic Spray etc. Amoxil start now change toothbrush in 2 days increase severe pain difficulty increasing swallowing shortness of breath stridor weakness emergency room  You should be improving over the next 2 to 3 days  If not improving  After quite a few days go to urgent care to reevaluate.

## 2022-12-20 NOTE — PROGRESS NOTES
"Chief Complaint  Sore Throat, Generalized Body Aches, Fever, and Earache    Subjective        Rosalie Mcdonald presents to [facility]  History of Present Illness  Patient complains of sore throat since Thursday swollen lymph nodes  Fever chills body aches ear pain right  No new nasal congestion cough or other respiratory illness no chest pain or shortness of breath think she may have strep        Objective   Vital Signs:  /68   Pulse 96   Temp 97.8 °F (36.6 °C) (Infrared)   Resp 14   Ht 161.3 cm (63.5\")   Wt 101 kg (223 lb 6.4 oz)   SpO2 96%   BMI 38.95 kg/m²   Estimated body mass index is 38.95 kg/m² as calculated from the following:    Height as of this encounter: 161.3 cm (63.5\").    Weight as of this encounter: 101 kg (223 lb 6.4 oz).          Physical Exam  Vitals reviewed.   Constitutional:       General: She is not in acute distress.     Appearance: She is well-developed. She is not ill-appearing, toxic-appearing or diaphoretic.   HENT:      Head: Normocephalic.      Comments: Tonsils 1+ erythemic scant exudate uvula midline no abscess no trismus speech clear     Nose: Nose normal.   Eyes:      General: No scleral icterus.     Conjunctiva/sclera: Conjunctivae normal.      Pupils: Pupils are equal, round, and reactive to light.   Neck:      Thyroid: No thyromegaly.      Vascular: No JVD.      Comments: Tender anterior lymph nodes increased on the left  Cardiovascular:      Rate and Rhythm: Normal rate and regular rhythm.      Heart sounds: Normal heart sounds. No murmur heard.    No friction rub. No gallop.   Pulmonary:      Effort: Pulmonary effort is normal. No respiratory distress.      Breath sounds: Normal breath sounds. No stridor. No wheezing or rales.   Abdominal:      General: Bowel sounds are normal. There is no distension.      Palpations: Abdomen is soft.      Tenderness: There is no abdominal tenderness.      Comments: No hepatosplenomegaly, no ascites, "   Musculoskeletal:         General: No tenderness.      Cervical back: Neck supple.   Lymphadenopathy:      Cervical: No cervical adenopathy.   Skin:     General: Skin is warm and dry.      Findings: No erythema or rash.   Neurological:      Mental Status: She is alert and oriented to person, place, and time.      Deep Tendon Reflexes: Reflexes are normal and symmetric.   Psychiatric:         Mood and Affect: Mood normal.         Behavior: Behavior normal.         Thought Content: Thought content normal.         Judgment: Judgment normal.        Result Review :                Assessment and Plan   Diagnoses and all orders for this visit:    1. Streptococcal tonsillitis (Primary)    Other orders  -     amoxicillin (AMOXIL) 500 MG capsule; Take 1 capsule by mouth 3 (Three) Times a Day.  Dispense: 30 capsule; Refill: 0             Follow Up   No follow-ups on file.  Patient was given instructions and counseling regarding her condition or for health maintenance advice. Please see specific information pulled into the AVS if appropriate.     There are no Patient Instructions on file for this visit.     Strep tonsillitis  Unlikely COVID but sugar check at home to make sure that she has a Ro[place] party next week  Push fluids ibuprofen 600 or 803 times a day as needed start Amoxil change toothbrush in 2 days  Increased difficulty swallowing shortness of breath severe pain severe headache weakness neck swelling emergency room  Recheck or urgent care if not improving in 3 to 4 days

## 2023-01-23 DIAGNOSIS — F32.A DEPRESSION, UNSPECIFIED DEPRESSION TYPE: ICD-10-CM

## 2023-01-23 NOTE — TELEPHONE ENCOUNTER
Rx Refill Note  Requested Prescriptions     Pending Prescriptions Disp Refills   • buPROPion XL (WELLBUTRIN XL) 300 MG 24 hr tablet [Pharmacy Med Name: BUPROPION XL 300MG TABLETS] 30 tablet 0     Sig: TAKE 1 TABLET BY MOUTH EVERY DAY      Last office visit with prescribing clinician: Visit date not found   Last telemedicine visit with prescribing clinician: Visit date not found   Next office visit with prescribing clinician: Visit date not found                         Would you like a call back once the refill request has been completed: [] Yes [] No    If the office needs to give you a call back, can they leave a voicemail: [] Yes [] No    Annette Lopez LPN  01/23/23, 14:22 EST

## 2023-01-24 RX ORDER — BUPROPION HYDROCHLORIDE 300 MG/1
TABLET ORAL
Qty: 30 TABLET | Refills: 0 | OUTPATIENT
Start: 2023-01-24

## 2023-02-06 DIAGNOSIS — F32.A DEPRESSION, UNSPECIFIED DEPRESSION TYPE: ICD-10-CM

## 2023-02-06 RX ORDER — BUPROPION HYDROCHLORIDE 300 MG/1
300 TABLET ORAL DAILY
Qty: 30 TABLET | Refills: 0 | OUTPATIENT
Start: 2023-02-06

## 2023-02-06 NOTE — TELEPHONE ENCOUNTER
Rx Refill Note  Requested Prescriptions     Pending Prescriptions Disp Refills   • buPROPion XL (WELLBUTRIN XL) 300 MG 24 hr tablet 30 tablet 0     Sig: Take 1 tablet by mouth Daily.      Last office visit with prescribing clinician: Visit date not found   Last telemedicine visit with prescribing clinician: Visit date not found   Next office visit with prescribing clinician: Visit date not found                         Would you like a call back once the refill request has been completed: [] Yes [] No    If the office needs to give you a call back, can they leave a voicemail: [] Yes [] No    Annette Lopez LPN  02/06/23, 16:54 EST

## 2023-02-06 NOTE — TELEPHONE ENCOUNTER
Caller: Rosalie Mcdonald    Relationship: Self    Best call back number: [phone]    Requested Prescriptions:   Requested Prescriptions     Pending Prescriptions Disp Refills   • buPROPion XL (WELLBUTRIN XL) 300 MG 24 hr tablet 30 tablet 0     Sig: Take 1 tablet by mouth Daily.        Pharmacy where request should be sent: [id number] - [facility] [address] [address] [address] [address] [address] [address] - [phone] [facility] [phone] FX     Additional details provided by patient: PATIENT IS OUT OF MEDICATION.    PATIENT STATES SHE MENTIONED THAT SHE NEED A REFILL FOR THIS TO [facility]. PATIENT WOULD LIKE TO KNOW WHY THIS WAS DENIED     Does the patient have less than a 3 day supply:  [x] Yes  [] No    Would you like a call back once the refill request has been completed: [] Yes [x] No    If the office needs to give you a call back, can they leave a voicemail: [] Yes [x] No    Tacho Connor Rep   02/06/23 16:14 EST

## 2023-02-08 NOTE — TELEPHONE ENCOUNTER
Breana,   Please call pt and ask her to come in at 9:30 am on 2/23/23. The 12:45 is a same day visit and should be saved for acute visits that pop up that week. This pt has not been to the office in years to see me. She is not urgent, and needs more time. If you can't physically move her on the schedule, ok, we can do when we are back in tomorrow. But notify pt her appointment will be at 9:30 am that day, not 12:45. Thanks.

## 2023-02-08 NOTE — TELEPHONE ENCOUNTER
Pt said she would be unable to make the 9:30 appt due to having to drop kids off to school. Pt also wanted to follow up on refill request.     Please advise

## 2023-02-10 DIAGNOSIS — F32.A DEPRESSION, UNSPECIFIED DEPRESSION TYPE: ICD-10-CM

## 2023-02-10 NOTE — TELEPHONE ENCOUNTER
Caller: Brians Rosalie WEBER    Relationship: Self    Best call back number: [phone]    Requested Prescriptions:   Requested Prescriptions     Pending Prescriptions Disp Refills   • buPROPion XL (WELLBUTRIN XL) 300 MG 24 hr tablet 30 tablet 0     Sig: Take 1 tablet by mouth Daily.        Pharmacy where request should be sent: [facility] #[id number] - [facility] [address] [address] AT [place] [address] [address] [address] [address] - [phone] [facility] [phone] FX     Additional details provided by patient: PATIENT HAS AN APPOINTMENT ON 2/23/23 TO FOLLOW UP ON MEDICATIONS, BUT IS OUT OF MEDICATION. REQUESTS AT LEAST A PARTIAL REFILL TO LAST UNTIL THAT APPOINTMENT    Does the patient have less than a 3 day supply:  [x] Yes  [] No    Would you like a call back once the refill request has been completed: [] Yes [x] No    If the office needs to give you a call back, can they leave a voicemail: [] Yes [x] No    Tacho Garsia Rep   02/10/23 13:52 EST

## 2023-02-11 RX ORDER — BUPROPION HYDROCHLORIDE 300 MG/1
300 TABLET ORAL DAILY
Qty: 30 TABLET | Refills: 0 | Status: SHIPPED | OUTPATIENT
Start: 2023-02-11 | End: 2023-03-02 | Stop reason: SDUPTHER

## 2023-03-02 ENCOUNTER — OFFICE VISIT (OUTPATIENT)
Dept: FAMILY MEDICINE CLINIC | Facility: CLINIC | Age: 63
End: 2023-03-02
Payer: COMMERCIAL

## 2023-03-02 VITALS
TEMPERATURE: 96.9 F | WEIGHT: 226.9 LBS | RESPIRATION RATE: 18 BRPM | HEIGHT: 64 IN | SYSTOLIC BLOOD PRESSURE: 128 MMHG | BODY MASS INDEX: 38.74 KG/M2 | DIASTOLIC BLOOD PRESSURE: 78 MMHG

## 2023-03-02 DIAGNOSIS — Z13.29 SCREENING FOR THYROID DISORDER: ICD-10-CM

## 2023-03-02 DIAGNOSIS — Z12.11 SCREENING FOR COLON CANCER: ICD-10-CM

## 2023-03-02 DIAGNOSIS — Z79.899 MEDICATION MANAGEMENT: ICD-10-CM

## 2023-03-02 DIAGNOSIS — Z13.220 SCREENING FOR LIPOID DISORDERS: ICD-10-CM

## 2023-03-02 DIAGNOSIS — L72.0 EPIDERMOID CYST OF SKIN OF BACK: Primary | ICD-10-CM

## 2023-03-02 DIAGNOSIS — Z12.31 ENCOUNTER FOR SCREENING MAMMOGRAM FOR MALIGNANT NEOPLASM OF BREAST: ICD-10-CM

## 2023-03-02 DIAGNOSIS — R73.09 ELEVATED GLUCOSE: ICD-10-CM

## 2023-03-02 DIAGNOSIS — F32.A DEPRESSION, UNSPECIFIED DEPRESSION TYPE: ICD-10-CM

## 2023-03-02 PROCEDURE — 99214 OFFICE O/P EST MOD 30 MIN: CPT | Performed by: NURSE PRACTITIONER

## 2023-03-02 RX ORDER — BUPROPION HYDROCHLORIDE 300 MG/1
300 TABLET ORAL DAILY
Qty: 90 TABLET | Refills: 1 | Status: SHIPPED | OUTPATIENT
Start: 2023-03-02

## 2023-03-02 NOTE — PROGRESS NOTES
Chief Complaint  Establish Care, Med Refill (Buprofin), Cyst (Hard on shoulder rt side, had long time,/Knots on 2nd toes.both feet), and referral  arnulfo    Subjective        Rosalie Mcdonald presents to [facility]  History of Present Illness    Rosalie Mcdonald is a 63 y.o. female presents for medication refills.     The patient complains of a cyst on the right side of her back. She states it has gotten hard and it itches.     She reports she has clear knots on each of her toes which are getting bigger. She adds when she drains it, it does not have an odor. She notes it is soft, and it is a callus. However, she notes she will constantly have to drain it. She adds with shoes it will bother it. She notes her brother has the same problem on his toes.      She adds she is interested in seeing a provider who is not judgmental about her weight. She adds she went through a 9-month period from being someone that was not heavy. She adds the same providers that she had for years to treated her terrible. She reports when she was younger, she would go on diet pills with benefit. She states she cannot seem to stay on a diet. She reports she will get powerful craving, and then she gets hungry. She adds she had a gastric bypass in 2001 and she lost 77 pounds. She adds she would get sick if she ate too many sweets with her gastric bypass. She reports she experiences swelling. She adds she is a borderline diabetic but she controls it with diet and exercise.     The patient states she knows she needs s colonoscopy completed. She adds she has not had a mammogram for a long time. She adds the last time she had a mammogram, there was an issue with one of her breasts. She adds all her appointments canceled, and then she got out of the habit of going to the doctor.      She adds she has received 2 COVID-19 vaccines. 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 She adds she did have the Tdap vaccine.    Complete ROS reviewed and negative except as mentioned in the HPI.    Objective   Vital Signs:  /78 (BP Location: Left arm, Patient Position: Sitting, Cuff Size: Adult)   Temp 96.9 °F (36.1 °C) (Temporal)   Resp 18   Ht 161.3 cm (63.5\")   Wt 103 kg (226 lb 14.4 oz)   BMI 39.56 kg/m²   Estimated body mass index is 39.56 kg/m² as calculated from the following:    Height as of this encounter: 161.3 cm (63.5\").    Weight as of this encounter: 103 kg (226 lb 14.4 oz).             Physical Exam  Vitals reviewed.   Constitutional:       General: She is not in acute distress.     Appearance: Normal appearance. She is well-developed. She is not diaphoretic.   HENT:      Head: Normocephalic and atraumatic.      Right Ear: Tympanic membrane, ear canal and external ear normal.      Left Ear: Tympanic membrane, ear canal and external ear normal.      Nose: Nose normal.      Mouth/Throat:      Pharynx: Uvula midline. No oropharyngeal exudate.   Cardiovascular:      Rate and Rhythm: Normal rate and regular rhythm.      Heart sounds: Normal heart sounds. No murmur heard.    No friction rub. No gallop.   Pulmonary:      Effort: Pulmonary effort is normal. No respiratory distress.      Breath sounds: Normal breath sounds. No wheezing or rales.   Abdominal:      General: Bowel sounds are normal. There is no distension.      Palpations: Abdomen is soft.      Tenderness: There is no abdominal tenderness.   Musculoskeletal:      Cervical back: Neck supple.   Skin:     General: Skin is warm and dry.   Neurological:      Mental Status: She is alert and oriented to person, place, and time.   Psychiatric:         Mood and Affect: Mood normal.        Result Review :                   Assessment and Plan   Diagnoses and all orders for this visit:    1. Epidermoid cyst of skin of back (Primary)  -     Cancel: Ambulatory Referral to Dermatology  -     Ambulatory Referral to " Dermatology    2. Elevated glucose  -     Hemoglobin A1c  -     Comprehensive metabolic panel    3. Screening for lipoid disorders  -     Lipid Panel With LDL / HDL Ratio    4. Screening for thyroid disorder  -     TSH Rfx On Abnormal To Free T4    5. Medication management  -     CBC & Differential    6. Screening for colon cancer  -     Ambulatory Referral For Screening Colonoscopy    7. Encounter for screening mammogram for malignant neoplasm of breast  -     Mammo screening digital tomosynthesis bilateral w CAD; Future    8. Depression, unspecified depression type  -     buPROPion XL (WELLBUTRIN XL) 300 MG 24 hr tablet; Take 1 tablet by mouth Daily.  Dispense: 90 tablet; Refill: 1    Assessment & Plan   Patient concerned about weight loss. We discussed weight loss medication. She does report she is a diet controlled, diabetic. We will check an A1c, and full panel of laboratory studies today. Colonoscopy and mammogram orders have been placed today. Follow-up pending outcome of labs.            Follow Up   No follow-ups on file.  Patient was given instructions and counseling regarding her condition or for health maintenance advice. Please see specific information pulled into the AVS if appropriate.    Transcribed from ambient dictation for RIMMA Munoz by Ana Pasucal.  03/02/23   13:43 EST    Patient or patient representative verbalized consent to the visit recording.  I have personally performed the services described in this document as transcribed by the above individual, and it is both accurate and complete.

## 2023-03-03 LAB
ALBUMIN SERPL-MCNC: 4.3 G/DL (ref 3.8–4.8)
ALBUMIN/GLOB SERPL: 1.5 {RATIO} (ref 1.2–2.2)
ALP SERPL-CCNC: 120 IU/L (ref 44–121)
ALT SERPL-CCNC: 13 IU/L (ref 0–32)
AST SERPL-CCNC: 15 IU/L (ref 0–40)
BASOPHILS # BLD AUTO: 0.1 X10E3/UL (ref 0–0.2)
BASOPHILS NFR BLD AUTO: 1 %
BILIRUB SERPL-MCNC: 0.2 MG/DL (ref 0–1.2)
BUN SERPL-MCNC: 18 MG/DL (ref 8–27)
BUN/CREAT SERPL: 27 (ref 12–28)
CALCIUM SERPL-MCNC: 10.2 MG/DL (ref 8.7–10.3)
CHLORIDE SERPL-SCNC: 104 MMOL/L (ref 96–106)
CHOLEST SERPL-MCNC: 225 MG/DL (ref 100–199)
CO2 SERPL-SCNC: 24 MMOL/L (ref 20–29)
CREAT SERPL-MCNC: 0.67 MG/DL (ref 0.57–1)
EGFRCR SERPLBLD CKD-EPI 2021: 99 ML/MIN/1.73
EOSINOPHIL # BLD AUTO: 0.1 X10E3/UL (ref 0–0.4)
EOSINOPHIL NFR BLD AUTO: 1 %
ERYTHROCYTE [DISTWIDTH] IN BLOOD BY AUTOMATED COUNT: 12.4 % (ref 11.7–15.4)
GLOBULIN SER CALC-MCNC: 2.8 G/DL (ref 1.5–4.5)
GLUCOSE SERPL-MCNC: 110 MG/DL (ref 70–99)
HBA1C MFR BLD: 6.3 % (ref 4.8–5.6)
HCT VFR BLD AUTO: 41.6 % (ref 34–46.6)
HDLC SERPL-MCNC: 51 MG/DL
HGB BLD-MCNC: 13.6 G/DL (ref 11.1–15.9)
IMM GRANULOCYTES # BLD AUTO: 0 X10E3/UL (ref 0–0.1)
IMM GRANULOCYTES NFR BLD AUTO: 0 %
LDLC SERPL CALC-MCNC: 147 MG/DL (ref 0–99)
LDLC/HDLC SERPL: 2.9 RATIO (ref 0–3.2)
LYMPHOCYTES # BLD AUTO: 2.3 X10E3/UL (ref 0.7–3.1)
LYMPHOCYTES NFR BLD AUTO: 33 %
MCH RBC QN AUTO: 28.8 PG (ref 26.6–33)
MCHC RBC AUTO-ENTMCNC: 32.7 G/DL (ref 31.5–35.7)
MCV RBC AUTO: 88 FL (ref 79–97)
MONOCYTES # BLD AUTO: 0.5 X10E3/UL (ref 0.1–0.9)
MONOCYTES NFR BLD AUTO: 7 %
NEUTROPHILS # BLD AUTO: 4.2 X10E3/UL (ref 1.4–7)
NEUTROPHILS NFR BLD AUTO: 58 %
PLATELET # BLD AUTO: 353 X10E3/UL (ref 150–450)
POTASSIUM SERPL-SCNC: 4.6 MMOL/L (ref 3.5–5.2)
PROT SERPL-MCNC: 7.1 G/DL (ref 6–8.5)
RBC # BLD AUTO: 4.73 X10E6/UL (ref 3.77–5.28)
SODIUM SERPL-SCNC: 142 MMOL/L (ref 134–144)
TRIGL SERPL-MCNC: 151 MG/DL (ref 0–149)
TSH SERPL DL<=0.005 MIU/L-ACNC: 1.75 UIU/ML (ref 0.45–4.5)
VLDLC SERPL CALC-MCNC: 27 MG/DL (ref 5–40)
WBC # BLD AUTO: 7.2 X10E3/UL (ref 3.4–10.8)

## 2023-03-13 ENCOUNTER — TELEPHONE (OUTPATIENT)
Dept: FAMILY MEDICINE CLINIC | Facility: CLINIC | Age: 63
End: 2023-03-13

## 2023-03-13 ENCOUNTER — HOSPITAL ENCOUNTER (OUTPATIENT)
Dept: MAMMOGRAPHY | Facility: HOSPITAL | Age: 63
Discharge: HOME | End: 2023-03-13
Admitting: NURSE PRACTITIONER
Payer: COMMERCIAL

## 2023-03-13 DIAGNOSIS — Z12.31 ENCOUNTER FOR SCREENING MAMMOGRAM FOR MALIGNANT NEOPLASM OF BREAST: ICD-10-CM

## 2023-03-13 PROCEDURE — 77067 SCR MAMMO BI INCL CAD: CPT

## 2023-03-13 PROCEDURE — 77063 BREAST TOMOSYNTHESIS BI: CPT

## 2023-03-13 NOTE — TELEPHONE ENCOUNTER
Caller: Rosalie Mcdonald    Relationship to patient: Self    Best call back number: [phone]    Patient is needing: PATIENT STATES PRACTICE NEEDS TO CALL  [facility] [phone] TO FIND OUT OF WEIGHT LOSS MEDICATION IS COVERED BY INSURANCE. IF MEDICATION IS NOT COVERED, PATIENT STATES ROSSI SCHNEIDER HAD DISCUSSED AN ALTERNATIVE MEDICATION   PLEASE ADVISE

## 2023-03-14 NOTE — TELEPHONE ENCOUNTER
Pt called back in to office this afternoon. Spoke with . This nurse was able to help her a little bit. Obtained a sheet that the office had made up to assist patients on weight loss medications with insurance companies. Pt does not have my chart. Emailed [employer] code to patient with instructions. Will email her the form shortly.

## 2023-03-14 NOTE — TELEPHONE ENCOUNTER
Reached out to pt. [facility] advising pt to return call, she is responsible for finding out if her insurance covers weight loss medication. We cannot do a PA without the questions that should have been given to her during her office visit. However if she is not willing to call and find out information, medication will not have PA completed as it is her reasonability to obtain information needed if she would like medication.

## 2023-10-25 ENCOUNTER — OFFICE VISIT (OUTPATIENT)
Dept: FAMILY MEDICINE CLINIC | Facility: CLINIC | Age: 63
End: 2023-10-25
Payer: COMMERCIAL

## 2023-10-25 VITALS
RESPIRATION RATE: 18 BRPM | HEART RATE: 73 BPM | WEIGHT: 223.5 LBS | DIASTOLIC BLOOD PRESSURE: 80 MMHG | OXYGEN SATURATION: 99 % | HEIGHT: 64 IN | SYSTOLIC BLOOD PRESSURE: 134 MMHG | BODY MASS INDEX: 38.16 KG/M2 | TEMPERATURE: 96.6 F

## 2023-10-25 DIAGNOSIS — M25.50 ARTHRALGIA, UNSPECIFIED JOINT: Primary | ICD-10-CM

## 2023-10-25 DIAGNOSIS — M54.12 CERVICAL RADICULOPATHY: ICD-10-CM

## 2023-10-25 DIAGNOSIS — R73.09 ELEVATED GLUCOSE: ICD-10-CM

## 2023-10-25 DIAGNOSIS — M54.6 CHRONIC MIDLINE THORACIC BACK PAIN: ICD-10-CM

## 2023-10-25 DIAGNOSIS — G89.29 CHRONIC MIDLINE THORACIC BACK PAIN: ICD-10-CM

## 2023-10-25 DIAGNOSIS — M50.30 DEGENERATIVE DISC DISEASE, CERVICAL: ICD-10-CM

## 2023-10-25 DIAGNOSIS — F32.A DEPRESSION, UNSPECIFIED DEPRESSION TYPE: ICD-10-CM

## 2023-10-25 DIAGNOSIS — J45.21 REACTIVE AIRWAY DISEASE, MILD INTERMITTENT, WITH ACUTE EXACERBATION: ICD-10-CM

## 2023-10-25 DIAGNOSIS — R60.9 SWELLING: ICD-10-CM

## 2023-10-25 RX ORDER — BUPROPION HYDROCHLORIDE 300 MG/1
300 TABLET ORAL DAILY
Qty: 90 TABLET | Refills: 1 | Status: SHIPPED | OUTPATIENT
Start: 2023-10-25

## 2023-10-25 RX ORDER — ALBUTEROL SULFATE 90 UG/1
2 AEROSOL, METERED RESPIRATORY (INHALATION) EVERY 4 HOURS PRN
Qty: 8 G | Refills: 12 | Status: SHIPPED | OUTPATIENT
Start: 2023-10-25

## 2023-10-25 NOTE — PROGRESS NOTES
"Chief Complaint  Tingling (Arms, neck), Med Refill (Inhaler, wellbutrin), and Pain (Back, and hands.)    Subjective        Rosalie Mcdonald presents to [facility]  History of Present Illness  Rosalie Mcdonald is a 63-year-old female who presents today for medication refills and neck and hand pain.    The patient states she is doing well. She reports she is experiencing pins and needles in both of her arms that is positional. She notes it did the same when she had Guillain-[place] syndrome. She states that she has had some pain in her hands, but not bad. She reports for the first time since 2015, she has been afraid her Guillain-[place] was reoccurring because it was different. She states it did not just start at her legs and radiate up. She notes when the Guillain-[place] begins, she starts having a lot of pain between the shoulders. She reports the numbness and tingling started in her arms and then she started having the same kind of pain in her hands. She notes that it is hard to describe but the pain was intermittent. She states it is hard to tell with her feet because sometimes if she sits with her feet up, she will sit down and get down and walk, they are a little numb.    She reports the back of her neck on one side of her spine is pins and needles and it will radiate up her neck. She states if she moves or falls asleep on a certain side, she will experience pain on both sides of her head. She reports her eyes feel funny when she taps the tip of her nose. She notes the neck and thoracic area bother her more. She states sometimes her entire spine is not okay and it is tender and sensitive in the area. She reports that Dr. Green was her spine doctor previously.    The patient states her entire back has something \"terribly wrong with it\". She reports if she bends over or leans over to the side, she can feel every single vertebrae move. She notes she has had an x-ray of her neck, back, and " lower back. She states that if she lies flat, she can barely get up. She denies getting an MRI of her back. She reports she has severe pain in the middle of her back. She states if she sits still at any given time, just not moving, she has severe pain. She feels like if she falls a little or had a car accident, she is likely to be paralyzed or dead. She denies pain radiating into her ribs. She notes occasionally it will be in her shoulders.     She states about 2 or 3 years ago, she had her heart looked at and it looked okay. She reports that she is having a lot of problems with more than normal swelling. She notes it is worse in her feet and legs. She states that she is so swollen in her face, eyes, feet and legs. She reports that she is always swollen and has a lot of tension in her shoulders. She notes when she is blinking her eyes, something is wrong. She states this happened before when she had Guillain-[place] syndrome.    The patient reports that the medication she has been taking helps her lose weight and she has lost some weight. She states right now she weighs less than she was at her last appointment, but she is swollen. She reports that she has not been able to get up and move around much for the last few days. She notes she has been careful and that has made it worse. She states that she does not use her albuterol that much. She reports if she gets a little bit of the flu, it goes right into her lungs, and she cannot get over it. She denies being on diabetic medicine. She states she can tell when her sugar is low.    Family history includes congestive heart failure. She reports they all passed away in their 60s.    Past medical history includes autoimmune syndrome. She states that she has been told she has carpal tunnel syndrome in both hands. She states it was bad for a while and then she had no symptoms at all for years.    Past surgeries include a fusion. She cannot remember who performed her  "surgery.      Objective   Vital Signs:  /80 (BP Location: Right arm, Patient Position: Sitting, Cuff Size: Adult)   Pulse 73   Temp 96.6 °F (35.9 °C) (Temporal)   Resp 18   Ht 161.3 cm (63.5\")   Wt 101 kg (223 lb 8 oz)   SpO2 99%   BMI 38.97 kg/m²   Estimated body mass index is 38.97 kg/m² as calculated from the following:    Height as of this encounter: 161.3 cm (63.5\").    Weight as of this encounter: 101 kg (223 lb 8 oz).          Physical Exam  Vitals reviewed.   Cardiovascular:      Rate and Rhythm: Normal rate and regular rhythm.      Heart sounds: No murmur heard.     No friction rub. No gallop.   Pulmonary:      Effort: Pulmonary effort is normal. No respiratory distress.      Breath sounds: Normal breath sounds. No wheezing, rhonchi or rales.   Musculoskeletal:      Cervical back: Tenderness present. No bony tenderness. No pain with movement. Normal range of motion.      Thoracic back: Bony tenderness present. No tenderness. Normal range of motion.   Skin:     General: Skin is warm and dry.   Neurological:      Mental Status: She is alert and oriented to person, place, and time.   Psychiatric:         Mood and Affect: Mood normal.        Result Review :                  Assessment and Plan   Diagnoses and all orders for this visit:    1. Arthralgia, unspecified joint (Primary)  -     LUIS F Direct Reflex to 11 Biomarker    2. Depression, unspecified depression type  -     buPROPion XL (WELLBUTRIN XL) 300 MG 24 hr tablet; Take 1 tablet by mouth Daily.  Dispense: 90 tablet; Refill: 1    3. Reactive airway disease, mild intermittent, with acute exacerbation  -     albuterol sulfate  (90 Base) MCG/ACT inhaler; Inhale 2 puffs Every 4 (Four) Hours As Needed for Wheezing or Shortness of Air (2 puffs q 4 hr prn).  Dispense: 8 g; Refill: 12    4. Swelling  -     LUIS F Direct Reflex to 11 Biomarker    5. Cervical radiculopathy  -     MRI Cervical Spine Without Contrast; Future  -     MRI thoracic spine " wo contrast; Future  -     Vitamin B12    6. Chronic midline thoracic back pain  -     MRI thoracic spine wo contrast; Future    7. Degenerative disc disease, cervical    8. Elevated glucose  -     Hemoglobin A1c  -     Comprehensive metabolic panel      1. Neck pain  - An order was placed for an MRI of the cervical and thoracic spine.  - A referral to neurosurgery has been placed.    2. Bilateral hand pain  - An order was placed for an LUIS F.    3. Diabetes  - An order was placed for hemoglobin A1c.    4. Depression  - A refill for Wellbutrin has been sent to the patient's pharmacy.         Follow Up   No follow-ups on file.  Patient was given instructions and counseling regarding her condition or for health maintenance advice. Please see specific information pulled into the AVS if appropriate.       Transcribed from ambient dictation for RIMMA Munoz by Izabel Gold.  10/25/23   12:48 EDT    Patient or patient representative verbalized consent to the visit recording.  I have personally performed the services described in this document as transcribed by the above individual, and it is both accurate and complete.

## 2023-10-26 LAB
ALBUMIN SERPL-MCNC: 4.5 G/DL (ref 3.5–5.2)
ALBUMIN/GLOB SERPL: 1.7 G/DL
ALP SERPL-CCNC: 127 U/L (ref 39–117)
ALT SERPL-CCNC: 16 U/L (ref 1–33)
ANA SER QL: NEGATIVE
AST SERPL-CCNC: 16 U/L (ref 1–32)
BILIRUB SERPL-MCNC: 0.3 MG/DL (ref 0–1.2)
BUN SERPL-MCNC: 15 MG/DL (ref 8–23)
BUN/CREAT SERPL: 23.4 (ref 7–25)
CALCIUM SERPL-MCNC: 10.8 MG/DL (ref 8.6–10.5)
CHLORIDE SERPL-SCNC: 105 MMOL/L (ref 98–107)
CO2 SERPL-SCNC: 29 MMOL/L (ref 22–29)
CREAT SERPL-MCNC: 0.64 MG/DL (ref 0.57–1)
EGFRCR SERPLBLD CKD-EPI 2021: 100.1 ML/MIN/1.73
GLOBULIN SER CALC-MCNC: 2.6 GM/DL
GLUCOSE SERPL-MCNC: 102 MG/DL (ref 65–99)
HBA1C MFR BLD: 5.9 % (ref 4.8–5.6)
POTASSIUM SERPL-SCNC: 4.8 MMOL/L (ref 3.5–5.2)
PROT SERPL-MCNC: 7.1 G/DL (ref 6–8.5)
SODIUM SERPL-SCNC: 141 MMOL/L (ref 136–145)
VIT B12 SERPL-MCNC: 1172 PG/ML (ref 211–946)

## 2023-11-19 ENCOUNTER — HOSPITAL ENCOUNTER (OUTPATIENT)
Dept: MRI IMAGING | Facility: HOSPITAL | Age: 63
Discharge: HOME | End: 2023-11-19
Admitting: NURSE PRACTITIONER
Payer: COMMERCIAL

## 2023-11-19 ENCOUNTER — HOSPITAL ENCOUNTER (OUTPATIENT)
Dept: MRI IMAGING | Facility: HOSPITAL | Age: 63
Discharge: HOME | End: 2023-11-19
Payer: COMMERCIAL

## 2023-11-19 DIAGNOSIS — M54.6 CHRONIC MIDLINE THORACIC BACK PAIN: ICD-10-CM

## 2023-11-19 DIAGNOSIS — M54.12 CERVICAL RADICULOPATHY: ICD-10-CM

## 2023-11-19 DIAGNOSIS — G89.29 CHRONIC MIDLINE THORACIC BACK PAIN: ICD-10-CM

## 2023-11-19 PROCEDURE — 72146 MRI CHEST SPINE W/O DYE: CPT

## 2023-11-19 PROCEDURE — 72141 MRI NECK SPINE W/O DYE: CPT

## 2024-02-20 RX ORDER — SULFAMETHOXAZOLE AND TRIMETHOPRIM 800; 160 MG/1; MG/1
1 TABLET ORAL 2 TIMES DAILY
Qty: 20 TABLET | Refills: 0 | Status: SHIPPED | OUTPATIENT
Start: 2024-02-20

## 2024-06-05 DIAGNOSIS — F32.A DEPRESSION, UNSPECIFIED DEPRESSION TYPE: ICD-10-CM

## 2024-06-05 RX ORDER — BUPROPION HYDROCHLORIDE 300 MG/1
300 TABLET ORAL DAILY
Qty: 90 TABLET | Refills: 1 | Status: SHIPPED | OUTPATIENT
Start: 2024-06-05

## 2025-02-11 ENCOUNTER — NURSE TRIAGE (OUTPATIENT)
Dept: CALL CENTER | Facility: HOSPITAL | Age: 65
End: 2025-02-11
Payer: COMMERCIAL

## 2025-02-11 ENCOUNTER — TELEPHONE (OUTPATIENT)
Dept: FAMILY MEDICINE CLINIC | Facility: CLINIC | Age: 65
End: 2025-02-11
Payer: COMMERCIAL

## 2025-02-11 NOTE — TELEPHONE ENCOUNTER
Pt called in with high bp on 162/100. Pt experienced a headache, but took a Asprin 81mg and the headache stopped. No symptoms at the time of the call. Advised w/ practice manager & stated that she can be seen tomorrow. Made appt for 2/12/25. I advised pt that is bp gets higher, any symptoms come about between now and appt, go onto the ER/UC. Pt voiced understanding.

## 2025-02-11 NOTE — TELEPHONE ENCOUNTER
Call transferred the HUB, unable to reach the office.  Caller states that she had a HA at the top of her head several hours ago and took her BP it was 160/102.  She took a 81 mg ASA and laid down to rest.  She retook her BP an hour later and it is 160/100.  Her HA is improving.  No HX of HTN, on no BP meds.  Warm transfer to the office for appt tomorrow.  Reason for Disposition  • Systolic BP  >= 160 OR Diastolic >= 100    Additional Information  • Negative: Difficult to awaken or acting confused (e.g., disoriented, slurred speech)  • Negative: SEVERE difficulty breathing (e.g., struggling for each breath, speaks in single words)  • Negative: [1] Weakness of the face, arm or leg on one side of the body AND [2] new-onset  • Negative: [1] Numbness (i.e., loss of sensation) of the face, arm or leg on one side of the body AND [2] new-onset  • Negative: [1] Chest pain lasts > 5 minutes AND [2] history of heart disease (i.e., heart attack, bypass surgery, angina, angioplasty, CHF)  • Negative: [1] Chest pain AND [2] took nitrogylcerin AND [3] pain was not relieved  • Negative: Sounds like a life-threatening emergency to the triager  • Negative: Symptom is main concern (e.g., headache, chest pain)  • Negative: Low blood pressure is main concern  • Negative: [1] Systolic BP  >= 160 OR Diastolic >= 100 AND [2] cardiac (e.g., breathing difficulty, chest pain) or neurologic symptoms (e.g., new-onset blurred or double vision, unsteady gait)  • Negative: [1] Pregnant 20 or more weeks (or postpartum < 6 weeks) AND [2] new hand or face swelling  • Negative: [1] Pregnant 20 or more weeks (or postpartum < 6 weeks) AND [2] Systolic BP >= 160 OR Diastolic >= 110  • Negative: [1] Systolic BP  >= 200 OR Diastolic >= 120 AND [2] having NO cardiac or neurologic symptoms  • Negative: [1] Pregnant 20 or more weeks (or postpartum < 6 weeks) AND [2] Systolic BP  >= 140 OR Diastolic >= 90  • Negative: [1] Systolic BP  >= 180 OR Diastolic >=  "110 AND [2] missed most recent dose of blood pressure medication  • Negative: Systolic BP  >= 180 OR Diastolic >= 110  • Negative: Ran out of BP medications    Answer Assessment - Initial Assessment Questions  1. BLOOD PRESSURE: \"What is the blood pressure?\" \"Did you take at least two measurements 5 minutes apart?\"      160/100  2. ONSET: \"When did you take your blood pressure?\"      Prior to call  3. HOW: \"How did you take your blood pressure?\" (e.g., automatic home BP monitor, visiting nurse)      Home monitor  4. HISTORY: \"Do you have a history of high blood pressure?\"      no  5. MEDICINES: \"Are you taking any medicines for blood pressure?\" \"Have you missed any doses recently?\"      no  6. OTHER SYMPTOMS: \"Do you have any symptoms?\" (e.g., blurred vision, chest pain, difficulty breathing, headache, weakness)      HA  7. PREGNANCY: \"Is there any chance you are pregnant?\" \"When was your last menstrual period?\"      no    Protocols used: Blood Pressure - High-ADULT-AH    "

## 2025-02-12 ENCOUNTER — OFFICE VISIT (OUTPATIENT)
Dept: FAMILY MEDICINE CLINIC | Facility: CLINIC | Age: 65
End: 2025-02-12
Payer: COMMERCIAL

## 2025-02-12 VITALS
DIASTOLIC BLOOD PRESSURE: 88 MMHG | SYSTOLIC BLOOD PRESSURE: 164 MMHG | TEMPERATURE: 97.7 F | HEART RATE: 87 BPM | HEIGHT: 64 IN | OXYGEN SATURATION: 97 % | BODY MASS INDEX: 38.36 KG/M2

## 2025-02-12 DIAGNOSIS — I10 ESSENTIAL HYPERTENSION: Primary | ICD-10-CM

## 2025-02-12 PROCEDURE — 99214 OFFICE O/P EST MOD 30 MIN: CPT | Performed by: FAMILY MEDICINE

## 2025-02-12 RX ORDER — OLMESARTAN MEDOXOMIL 20 MG/1
20 TABLET ORAL DAILY
Qty: 30 TABLET | Refills: 0 | Status: SHIPPED | OUTPATIENT
Start: 2025-02-12

## 2025-02-12 NOTE — PROGRESS NOTES
"Chief Complaint  Hypertension (Headache )    Subjective        Rosalie Mcdonald presents to [facility]  History of Present Illness    I am seeing this patient for the first time today.  Her primary care provider is not available.    She states her blood pressure has been \"spiking\" for a number of weeks.  She will check it.  It will be elevated.  She will lie down and relax and get better.  This all started a couple months ago.  She also restarted her bupropion few months ago.  Were not sure of the timing here.  Yesterday she noted more severe elevated blood pressure readings.  160/100.  She also fell like she had a mild headache on top of her head.  No squeezing chest pain or chest pressure.  No focal neurological deficits.  No syncopal episodes.  No major stressors she states.  I reviewed her chart.  In previous visits last year or the year before, blood pressure was running normal.  No lab work for the last year or so.    Objective   Vital Signs:  /88   Pulse 87   Temp 97.7 °F (36.5 °C) (Temporal)   Ht 162.6 cm (64\")   SpO2 97%   BMI 38.36 kg/m²   Estimated body mass index is 38.36 kg/m² as calculated from the following:    Height as of this encounter: 162.6 cm (64\").    Weight as of 10/25/23: 101 kg (223 lb 8 oz).          Physical Exam  Constitutional:       General: She is not in acute distress.  Cardiovascular:      Rate and Rhythm: Normal rate and regular rhythm.   Pulmonary:      Effort: Pulmonary effort is normal.      Breath sounds: Normal breath sounds.   Neurological:      Coordination: Coordination normal.      Gait: Gait normal.   Psychiatric:         Mood and Affect: Mood normal.        Result Review :                Assessment and Plan   Diagnoses and all orders for this visit:    1. Essential hypertension (Primary)  -     CBC & Differential  -     Comprehensive Metabolic Panel  -     Urinalysis With Microscopic If Indicated (No Culture) - Urine, Clean " Catch    Other orders  -     olmesartan (Benicar) 20 MG tablet; Take 1 tablet by mouth Daily.  Dispense: 30 tablet; Refill: 0    Probable new diagnosis of essential hypertension.  May or may not be symptomatic.  Repeat blood pressure today is 160/100 right arm.  I am recommending a losartan 20 mg daily.  I am recommending she check her blood pressure daily and write the numbers down.  I recommend routine lab work today.  I recommend she see her primary care provider in the next 2 weeks for follow-up.  Bring blood pressure numbers to her.  Patient was counseled to go to the emergency room immediately with severe headache, or other severe symptoms.    She is taking 300 mg bupropion.  This may raise blood pressure.  This likely readdressed by PCP if blood pressure does not improve.         Follow Up   No follow-ups on file.  Patient was given instructions and counseling regarding her condition or for health maintenance advice. Please see specific information pulled into the AVS if appropriate.

## 2025-02-13 LAB
ALBUMIN SERPL-MCNC: 4.4 G/DL (ref 3.5–5.2)
ALBUMIN/GLOB SERPL: 1.4 G/DL
ALP SERPL-CCNC: 146 U/L (ref 39–117)
ALT SERPL-CCNC: 11 U/L (ref 1–33)
APPEARANCE UR: CLEAR
AST SERPL-CCNC: 12 U/L (ref 1–32)
BACTERIA #/AREA URNS HPF: NORMAL /HPF
BASOPHILS # BLD AUTO: 0.04 10*3/MM3 (ref 0–0.2)
BASOPHILS NFR BLD AUTO: 0.5 % (ref 0–1.5)
BILIRUB SERPL-MCNC: 0.3 MG/DL (ref 0–1.2)
BILIRUB UR QL STRIP: NEGATIVE
BUN SERPL-MCNC: 14 MG/DL (ref 8–23)
BUN/CREAT SERPL: 18.9 (ref 7–25)
CALCIUM SERPL-MCNC: 10.6 MG/DL (ref 8.6–10.5)
CASTS URNS MICRO: NORMAL
CHLORIDE SERPL-SCNC: 100 MMOL/L (ref 98–107)
CO2 SERPL-SCNC: 29.6 MMOL/L (ref 22–29)
COLOR UR: YELLOW
CREAT SERPL-MCNC: 0.74 MG/DL (ref 0.57–1)
EGFRCR SERPLBLD CKD-EPI 2021: 90.5 ML/MIN/1.73
EOSINOPHIL # BLD AUTO: 0.12 10*3/MM3 (ref 0–0.4)
EOSINOPHIL NFR BLD AUTO: 1.4 % (ref 0.3–6.2)
EPI CELLS #/AREA URNS HPF: NORMAL /HPF
ERYTHROCYTE [DISTWIDTH] IN BLOOD BY AUTOMATED COUNT: 12 % (ref 12.3–15.4)
GLOBULIN SER CALC-MCNC: 3.1 GM/DL
GLUCOSE SERPL-MCNC: 97 MG/DL (ref 65–99)
GLUCOSE UR QL STRIP: NEGATIVE
HCT VFR BLD AUTO: 42.5 % (ref 34–46.6)
HGB BLD-MCNC: 14.2 G/DL (ref 12–15.9)
HGB UR QL STRIP: NEGATIVE
IMM GRANULOCYTES # BLD AUTO: 0.03 10*3/MM3 (ref 0–0.05)
IMM GRANULOCYTES NFR BLD AUTO: 0.4 % (ref 0–0.5)
KETONES UR QL STRIP: NEGATIVE
LEUKOCYTE ESTERASE UR QL STRIP: ABNORMAL
LYMPHOCYTES # BLD AUTO: 2.35 10*3/MM3 (ref 0.7–3.1)
LYMPHOCYTES NFR BLD AUTO: 28.3 % (ref 19.6–45.3)
MCH RBC QN AUTO: 29.3 PG (ref 26.6–33)
MCHC RBC AUTO-ENTMCNC: 33.4 G/DL (ref 31.5–35.7)
MCV RBC AUTO: 87.8 FL (ref 79–97)
MONOCYTES # BLD AUTO: 0.45 10*3/MM3 (ref 0.1–0.9)
MONOCYTES NFR BLD AUTO: 5.4 % (ref 5–12)
NEUTROPHILS # BLD AUTO: 5.31 10*3/MM3 (ref 1.7–7)
NEUTROPHILS NFR BLD AUTO: 64 % (ref 42.7–76)
NITRITE UR QL STRIP: NEGATIVE
NRBC BLD AUTO-RTO: 0 /100 WBC (ref 0–0.2)
PH UR STRIP: 6.5 [PH] (ref 5–8)
PLATELET # BLD AUTO: 384 10*3/MM3 (ref 140–450)
POTASSIUM SERPL-SCNC: 4.5 MMOL/L (ref 3.5–5.2)
PROT SERPL-MCNC: 7.5 G/DL (ref 6–8.5)
PROT UR QL STRIP: NEGATIVE
RBC # BLD AUTO: 4.84 10*6/MM3 (ref 3.77–5.28)
RBC #/AREA URNS HPF: NORMAL /HPF
SODIUM SERPL-SCNC: 138 MMOL/L (ref 136–145)
SP GR UR STRIP: 1.01 (ref 1–1.03)
UROBILINOGEN UR STRIP-MCNC: ABNORMAL MG/DL
WBC # BLD AUTO: 8.3 10*3/MM3 (ref 3.4–10.8)
WBC #/AREA URNS HPF: NORMAL /HPF

## 2025-02-26 ENCOUNTER — OFFICE VISIT (OUTPATIENT)
Dept: FAMILY MEDICINE CLINIC | Facility: CLINIC | Age: 65
End: 2025-02-26
Payer: COMMERCIAL

## 2025-02-26 VITALS
DIASTOLIC BLOOD PRESSURE: 100 MMHG | OXYGEN SATURATION: 96 % | HEIGHT: 64 IN | BODY MASS INDEX: 38.07 KG/M2 | RESPIRATION RATE: 17 BRPM | SYSTOLIC BLOOD PRESSURE: 140 MMHG | HEART RATE: 74 BPM | WEIGHT: 223 LBS

## 2025-02-26 DIAGNOSIS — G89.29 CHRONIC BILATERAL LOW BACK PAIN WITH LEFT-SIDED SCIATICA: ICD-10-CM

## 2025-02-26 DIAGNOSIS — Z78.0 POSTMENOPAUSAL: ICD-10-CM

## 2025-02-26 DIAGNOSIS — G89.29 CHRONIC MIDLINE THORACIC BACK PAIN: ICD-10-CM

## 2025-02-26 DIAGNOSIS — M54.6 CHRONIC MIDLINE THORACIC BACK PAIN: ICD-10-CM

## 2025-02-26 DIAGNOSIS — R73.01 ABNORMAL FASTING GLUCOSE: ICD-10-CM

## 2025-02-26 DIAGNOSIS — M54.42 CHRONIC BILATERAL LOW BACK PAIN WITH LEFT-SIDED SCIATICA: ICD-10-CM

## 2025-02-26 DIAGNOSIS — I10 ESSENTIAL HYPERTENSION: Primary | ICD-10-CM

## 2025-02-26 DIAGNOSIS — Z12.31 ENCOUNTER FOR SCREENING MAMMOGRAM FOR MALIGNANT NEOPLASM OF BREAST: ICD-10-CM

## 2025-02-26 DIAGNOSIS — M54.12 CERVICAL RADICULOPATHY: ICD-10-CM

## 2025-02-26 DIAGNOSIS — Z13.6 SCREENING FOR HEART DISEASE: ICD-10-CM

## 2025-02-26 DIAGNOSIS — E78.2 MODERATE MIXED HYPERLIPIDEMIA NOT REQUIRING STATIN THERAPY: ICD-10-CM

## 2025-02-26 RX ORDER — HYDROCHLOROTHIAZIDE 25 MG/1
25 TABLET ORAL DAILY
Qty: 30 TABLET | Refills: 1 | Status: SHIPPED | OUTPATIENT
Start: 2025-02-26

## 2025-02-26 RX ORDER — OLMESARTAN MEDOXOMIL 40 MG/1
40 TABLET ORAL DAILY
Qty: 30 TABLET | Refills: 1 | Status: SHIPPED | OUTPATIENT
Start: 2025-02-26

## 2025-02-27 NOTE — PROGRESS NOTES
Chief Complaint  Hypertension (Pt here for 2 week f/u )    Subjective        Rosalie Mcdonald presents to [facility]  Hypertension      History of Present Illness  The patient is a 65-year-old female who presents for evaluation of hypertension, back pain, and weight management.     She has been monitoring her blood pressure at home, recording readings of 169/101, 162/107, and 157/101 in the left arm, and 168/102 in the right arm on 02/21/2025. Prior to starting antihypertensive therapy, her blood pressure peaked at 170/110. She experienced a dull headache, facial swelling, nausea, and discomfort, prompting her to seek medical attention. Despite taking olmesartan 20 mg daily, her blood pressure remains elevated, with the lowest recorded reading being 140/88. She reports no improvement in her swelling and expresses concern about her elevated calcium levels. She has observed a correlation between her diet and blood pressure, noting that consumption of Chinese food or lunch meat results in increased blood pressure the following day. She reports no chest pain but did experience twinges and shoulder pain for a week, leading her to suspect a possible heart attack. She has a family history of congestive heart failure, with both her father and first cousins succumbing to the disease in their early 60s. She underwent cardiac evaluation a few years ago, which revealed normal heart function.    She reports severe back pain, describing a popping sensation in her spine when walking. She has undergone imaging of her upper body and is scheduled for lower body imaging. She experiences pain even with minimal bending or turning, and reports a dull ache in her lower back that intensifies with activity. The pain occasionally radiates into her legs and is more pronounced on the left side. She also reports hip pain that disrupts her sleep. She has a history of back issues dating back to high school and is  "concerned about the risk of falls due to her back condition. She may have had a bone density screening a few years ago.    She has gained approximately 12 pounds and is struggling to lose the weight. She has resumed Wellbutrin, which she finds helpful in controlling her cravings and aiding weight loss. She has temporarily discontinued the medication due to concerns about its potential impact on her blood pressure. She engages in regular exercise.    Supplemental Information  She takes B12, methylfolate, folate, and vitamin D3 because her vitamin D3 has been low. She thought maybe she was taking too much of that, so she backed off and has been taking that either every other day or every 2 days. She can not remember the dose of vitamin D3.    FAMILY HISTORY  Her sister-in-law had high blood pressure and was diagnosed with cancer, passing away a few months later. Her father and first cousins had congestive heart failure and  in their early 60s.    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    Objective   Vital Signs:  /100   Pulse 74   Resp 17   Ht 162.6 cm (64\")   Wt 101 kg (223 lb)   SpO2 96%   BMI 38.28 kg/m²   Estimated body mass index is 38.28 kg/m² as calculated from the following:    Height as of this encounter: 162.6 cm (64\").    Weight as of this encounter: 101 kg (223 lb).             Physical Exam  Constitutional:       General: She is not in acute distress.     Appearance: She is well-developed. She is not diaphoretic.   Cardiovascular:      Rate and Rhythm: Normal rate and regular rhythm.      Heart sounds: Normal heart sounds. No murmur heard.     No friction rub. No gallop.   Pulmonary:      Effort: Pulmonary effort is normal. No respiratory distress.      Breath sounds: Normal breath sounds. No wheezing or rales.   Musculoskeletal:      Cervical back: " Neck supple.   Skin:     General: Skin is warm and dry.   Neurological:      Mental Status: She is alert and oriented to person, place, and time.       Physical Exam  Vital Signs  Blood pressure is 140/100.    Result Review :          Results  Laboratory Studies  CMP showed calcium level 0.1 above normal. A1c was 5.9 on 10/23/2024, before it got up to 6.3.    Assessment & Plan  1. Hypertension.  Her blood pressure readings have been consistently elevated, with home readings as high as 169/101 mmHg in the left arm and 162/107 mmHg in the right arm. She is currently on olmesartan 20 mg daily, which has not sufficiently controlled her blood pressure. The dosage of olmesartan will be increased to 40 mg daily. Additionally, hydrochlorothiazide 25 mg will be introduced, with the option to increase to 50 mg if necessary. A CT calcium score test will be ordered to assess for potential cardiac issues. She is advised to follow a low-sodium diet and monitor her blood pressure regularly. Potassium levels will be checked during the next visit.    2. Back pain.  She reports significant back pain, which is exacerbated by movement and radiates into her legs. Previous imaging showed degenerative changes and a history of cervical fusion. An MRI of the lumbar spine will be ordered to further evaluate the cause of her pain. A referral to pain management will be made for potential injections and further management. A DEXA scan will be ordered to assess bone density, as her last scan was in 2018.    3. Weight management.  She has gained approximately 12 pounds and is struggling to lose weight. She has been taking Wellbutrin, which helps with cravings and weight loss, but discontinued it due to concerns about blood pressure. She is advised to resume Wellbutrin and monitor her blood pressure. If her A1c is in the diabetic range, appropriate medication will be considered. If not, a compound medication will be prescribed after obtaining  consent.    4. Health maintenance.  A mammogram will be ordered as she is overdue for this screening. She is advised to complete her blood work before the next visit to discuss immunizations and other health maintenance topics.    Follow-up  The patient will follow up in 1 month.    PROCEDURE  The patient has a history of cervical fusion.           Assessment and Plan     Diagnoses and all orders for this visit:    1. Essential hypertension (Primary)  -     TSH Rfx On Abnormal To Free T4; Future  -     TSH Rfx On Abnormal To Free T4    2. Chronic bilateral low back pain with left-sided sciatica  -     MRI Lumbar Spine Without Contrast; Future  -     Ambulatory Referral to Pain Management    3. Screening for heart disease  -     CT Cardiac Calcium Score Without Dye; Future    4. Cervical radiculopathy    5. Chronic midline thoracic back pain    6. Encounter for screening mammogram for malignant neoplasm of breast  -     Mammo screening digital tomosynthesis bilateral w CAD; Future    7. Postmenopausal  -     DEXA Bone Density Axial; Future    8. Moderate mixed hyperlipidemia not requiring statin therapy  -     CBC & Differential; Future  -     Comprehensive Metabolic Panel; Future  -     Lipid Panel With LDL / HDL Ratio; Future  -     Lipid Panel With LDL / HDL Ratio  -     Comprehensive Metabolic Panel  -     CBC & Differential    9. Abnormal fasting glucose  -     Hemoglobin A1c; Future  -     Hemoglobin A1c    Other orders  -     olmesartan (BENICAR) 40 MG tablet; Take 1 tablet by mouth Daily.  Dispense: 30 tablet; Refill: 1  -     hydroCHLOROthiazide 25 MG tablet; Take 1 tablet by mouth Daily.  Dispense: 30 tablet; Refill: 1             Follow Up     No follow-ups on file.  Patient was given instructions and counseling regarding her condition or for health maintenance advice. Please see specific information pulled into the AVS if appropriate.       Patient or patient representative verbalized consent for the use  of Ambient Listening during the visit with  RIMMA Munoz for chart documentation. 2/27/2025  17:22 EST

## 2025-03-11 ENCOUNTER — HOSPITAL ENCOUNTER (OUTPATIENT)
Dept: MAMMOGRAPHY | Facility: HOSPITAL | Age: 65
Discharge: HOME | End: 2025-03-11
Payer: COMMERCIAL

## 2025-03-11 ENCOUNTER — HOSPITAL ENCOUNTER (OUTPATIENT)
Dept: BONE DENSITY | Facility: HOSPITAL | Age: 65
Discharge: HOME | End: 2025-03-11
Payer: COMMERCIAL

## 2025-03-11 DIAGNOSIS — Z78.0 POSTMENOPAUSAL: ICD-10-CM

## 2025-03-11 DIAGNOSIS — Z12.31 ENCOUNTER FOR SCREENING MAMMOGRAM FOR MALIGNANT NEOPLASM OF BREAST: ICD-10-CM

## 2025-03-11 PROCEDURE — 77067 SCR MAMMO BI INCL CAD: CPT

## 2025-03-11 PROCEDURE — 77063 BREAST TOMOSYNTHESIS BI: CPT

## 2025-03-11 PROCEDURE — 77080 DXA BONE DENSITY AXIAL: CPT

## 2025-03-14 ENCOUNTER — HOSPITAL ENCOUNTER (OUTPATIENT)
Dept: CT IMAGING | Facility: HOSPITAL | Age: 65
Discharge: HOME | End: 2025-03-14
Admitting: NURSE PRACTITIONER

## 2025-03-14 DIAGNOSIS — Z13.6 SCREENING FOR HEART DISEASE: ICD-10-CM

## 2025-03-14 PROCEDURE — 75571 CT HRT W/O DYE W/CA TEST: CPT

## 2025-03-20 LAB
ALBUMIN SERPL-MCNC: 3.9 G/DL (ref 3.5–5.2)
ALBUMIN/GLOB SERPL: 1.5 G/DL
ALP SERPL-CCNC: 138 U/L (ref 39–117)
ALT SERPL-CCNC: 10 U/L (ref 1–33)
AST SERPL-CCNC: 16 U/L (ref 1–32)
BASOPHILS # BLD AUTO: 0.03 10*3/MM3 (ref 0–0.2)
BASOPHILS NFR BLD AUTO: 0.5 % (ref 0–1.5)
BILIRUB SERPL-MCNC: <0.2 MG/DL (ref 0–1.2)
BUN SERPL-MCNC: 15 MG/DL (ref 8–23)
BUN/CREAT SERPL: 20.8 (ref 7–25)
CALCIUM SERPL-MCNC: 9.1 MG/DL (ref 8.6–10.5)
CHLORIDE SERPL-SCNC: 102 MMOL/L (ref 98–107)
CHOLEST SERPL-MCNC: 211 MG/DL (ref 0–200)
CO2 SERPL-SCNC: 26.3 MMOL/L (ref 22–29)
CREAT SERPL-MCNC: 0.72 MG/DL (ref 0.57–1)
EGFRCR SERPLBLD CKD-EPI 2021: 93.5 ML/MIN/1.73
EOSINOPHIL # BLD AUTO: 0.03 10*3/MM3 (ref 0–0.4)
EOSINOPHIL NFR BLD AUTO: 0.5 % (ref 0.3–6.2)
ERYTHROCYTE [DISTWIDTH] IN BLOOD BY AUTOMATED COUNT: 12.1 % (ref 12.3–15.4)
GLOBULIN SER CALC-MCNC: 2.6 GM/DL
GLUCOSE SERPL-MCNC: 119 MG/DL (ref 65–99)
HBA1C MFR BLD: 6.3 % (ref 4.8–5.6)
HCT VFR BLD AUTO: 41.9 % (ref 34–46.6)
HDLC SERPL-MCNC: 40 MG/DL (ref 40–60)
HGB BLD-MCNC: 13.4 G/DL (ref 12–15.9)
IMM GRANULOCYTES # BLD AUTO: 0.03 10*3/MM3 (ref 0–0.05)
IMM GRANULOCYTES NFR BLD AUTO: 0.5 % (ref 0–0.5)
LDLC SERPL CALC-MCNC: 148 MG/DL (ref 0–100)
LDLC/HDLC SERPL: 3.64 {RATIO}
LYMPHOCYTES # BLD AUTO: 1 10*3/MM3 (ref 0.7–3.1)
LYMPHOCYTES NFR BLD AUTO: 16.6 % (ref 19.6–45.3)
MCH RBC QN AUTO: 28.9 PG (ref 26.6–33)
MCHC RBC AUTO-ENTMCNC: 32 G/DL (ref 31.5–35.7)
MCV RBC AUTO: 90.3 FL (ref 79–97)
MONOCYTES # BLD AUTO: 0.38 10*3/MM3 (ref 0.1–0.9)
MONOCYTES NFR BLD AUTO: 6.3 % (ref 5–12)
NEUTROPHILS # BLD AUTO: 4.54 10*3/MM3 (ref 1.7–7)
NEUTROPHILS NFR BLD AUTO: 75.6 % (ref 42.7–76)
NRBC BLD AUTO-RTO: 0 /100 WBC (ref 0–0.2)
PLATELET # BLD AUTO: 307 10*3/MM3 (ref 140–450)
POTASSIUM SERPL-SCNC: 4.5 MMOL/L (ref 3.5–5.2)
PROT SERPL-MCNC: 6.5 G/DL (ref 6–8.5)
RBC # BLD AUTO: 4.64 10*6/MM3 (ref 3.77–5.28)
SODIUM SERPL-SCNC: 139 MMOL/L (ref 136–145)
TRIGL SERPL-MCNC: 128 MG/DL (ref 0–150)
TSH SERPL DL<=0.005 MIU/L-ACNC: 2.08 UIU/ML (ref 0.27–4.2)
VLDLC SERPL CALC-MCNC: 23 MG/DL (ref 5–40)
WBC # BLD AUTO: 6.01 10*3/MM3 (ref 3.4–10.8)

## 2025-03-23 ENCOUNTER — HOSPITAL ENCOUNTER (OUTPATIENT)
Dept: MRI IMAGING | Facility: HOSPITAL | Age: 65
Discharge: HOME | End: 2025-03-23
Admitting: NURSE PRACTITIONER
Payer: COMMERCIAL

## 2025-03-23 DIAGNOSIS — G89.29 CHRONIC BILATERAL LOW BACK PAIN WITH LEFT-SIDED SCIATICA: ICD-10-CM

## 2025-03-23 DIAGNOSIS — M54.42 CHRONIC BILATERAL LOW BACK PAIN WITH LEFT-SIDED SCIATICA: ICD-10-CM

## 2025-03-23 PROCEDURE — 72148 MRI LUMBAR SPINE W/O DYE: CPT

## 2025-03-26 ENCOUNTER — HOSPITAL ENCOUNTER (OUTPATIENT)
Dept: GENERAL RADIOLOGY | Facility: HOSPITAL | Age: 65
Discharge: HOME | End: 2025-03-26
Admitting: NURSE PRACTITIONER
Payer: COMMERCIAL

## 2025-03-26 ENCOUNTER — OFFICE VISIT (OUTPATIENT)
Dept: FAMILY MEDICINE CLINIC | Facility: CLINIC | Age: 65
End: 2025-03-26
Payer: COMMERCIAL

## 2025-03-26 DIAGNOSIS — G89.29 CHRONIC BILATERAL LOW BACK PAIN WITH LEFT-SIDED SCIATICA: Primary | ICD-10-CM

## 2025-03-26 DIAGNOSIS — M54.42 CHRONIC BILATERAL LOW BACK PAIN WITH LEFT-SIDED SCIATICA: Primary | ICD-10-CM

## 2025-03-26 DIAGNOSIS — M25.552 LEFT HIP PAIN: ICD-10-CM

## 2025-03-26 DIAGNOSIS — M54.6 CHRONIC MIDLINE THORACIC BACK PAIN: ICD-10-CM

## 2025-03-26 DIAGNOSIS — G89.29 CHRONIC MIDLINE THORACIC BACK PAIN: ICD-10-CM

## 2025-03-26 DIAGNOSIS — M54.12 CERVICAL RADICULOPATHY: ICD-10-CM

## 2025-03-26 PROCEDURE — 73502 X-RAY EXAM HIP UNI 2-3 VIEWS: CPT

## 2025-03-26 PROCEDURE — 99214 OFFICE O/P EST MOD 30 MIN: CPT | Performed by: NURSE PRACTITIONER

## 2025-03-26 RX ORDER — TIRZEPATIDE 2.5 MG/.5ML
2.5 INJECTION, SOLUTION SUBCUTANEOUS WEEKLY
Qty: 2 ML | Refills: 0 | Status: SHIPPED | OUTPATIENT
Start: 2025-03-26

## 2025-03-26 RX ORDER — OLMESARTAN MEDOXOMIL 20 MG/1
20 TABLET ORAL DAILY
Qty: 90 TABLET | Refills: 1 | Status: SHIPPED | OUTPATIENT
Start: 2025-03-26

## 2025-03-26 RX ORDER — TIRZEPATIDE 2.5 MG/.5ML
2.5 INJECTION, SOLUTION SUBCUTANEOUS WEEKLY
Qty: 2 ML | Refills: 0 | Status: SHIPPED | OUTPATIENT
Start: 2025-03-26 | End: 2025-03-26 | Stop reason: SDUPTHER

## 2025-03-30 NOTE — PROGRESS NOTES
Chief Complaint  No chief complaint on file.    Janette Mcdonald presents to [facility]  History of Present Illness  History of Present Illness  The patient presents for evaluation of back pain, hip pain, hypertension, weight management, and osteopenia.    She has been experiencing severe lower back pain, which has progressively worsened. She reports a sensation of popping in her vertebrae during movement. She expresses concern about her ability to engage in weightlifting exercises due to her back condition. She had previously scheduled an appointment with pain management but decided to cancel it pending further evaluation of her MRI results. She has a history of neck surgery performed by Dr. Scott at [facility] in 2015.    She also reports worsening hip pain, which is bilateral and affects her range of motion.    She has been managing her blood pressure with olmesartan 20 mg but recently increased the dosage to 40 mg due to perceived elevated blood pressure. She expresses apprehension about the higher dosage and inquires about the possibility of concurrent administration of her antihypertensive medication and diuretic. She reports feeling fatigued recently and attributes this to her blood pressure medication. She took half of a 40 mg tablet today as she ran out of the 20 mg tablets. She also took her medication this morning on an empty stomach after experiencing a headache and elevated blood pressure.    She has noticed recent weight gain and expresses interest in starting a compounded medication for weight loss, even if it is not covered by her insurance.    She has undergone a bone scan and a calcium heart test. She expresses concern about potential bone marrow issues based on her blood work results.    Supplemental Information  She has a history of Guillain-[place] syndrome.    FAMILY HISTORY  The patient reports a family history of heart attacks, strokes, and  "congestive heart failure on both sides of the family.    MEDICATIONS  Current: Olmesartan    Objective   Vital Signs:  There were no vitals taken for this visit.  Estimated body mass index is 38.28 kg/m² as calculated from the following:    Height as of 2/26/25: 162.6 cm (64\").    Weight as of 2/26/25: 101 kg (223 lb).               Physical Exam  Vitals reviewed.   Constitutional:       General: She is not in acute distress.     Appearance: She is well-developed. She is not diaphoretic.   HENT:      Head: Normocephalic and atraumatic.      Right Ear: Tympanic membrane, ear canal and external ear normal.      Left Ear: Tympanic membrane, ear canal and external ear normal.      Nose: Nose normal.      Mouth/Throat:      Mouth: Mucous membranes are moist.      Pharynx: Oropharynx is clear. Uvula midline. No oropharyngeal exudate.   Eyes:      Conjunctiva/sclera: Conjunctivae normal.      Pupils: Pupils are equal, round, and reactive to light.   Cardiovascular:      Rate and Rhythm: Normal rate and regular rhythm.      Heart sounds: Normal heart sounds. No murmur heard.     No friction rub. No gallop.   Pulmonary:      Effort: Pulmonary effort is normal. No respiratory distress.      Breath sounds: Normal breath sounds. No wheezing or rales.   Abdominal:      General: Bowel sounds are normal. There is no distension.      Palpations: Abdomen is soft.      Tenderness: There is no abdominal tenderness.   Musculoskeletal:      Cervical back: Neck supple.   Lymphadenopathy:      Cervical: No cervical adenopathy.   Skin:     General: Skin is warm and dry.   Neurological:      Mental Status: She is alert and oriented to person, place, and time.   Psychiatric:         Mood and Affect: Mood normal.       Physical Exam  Vital Signs  Blood pressure is 122/82.    Result Review :          Results  Laboratory Studies  Sodium is normal. Potassium is normal. Glucose was 119. A1c is 6.3. Total cholesterol is slightly elevated. LDL is " elevated at 148. White count is normal. RDW is 0.2 below normal. Lymphocytes are slightly low.    Imaging  MRI of the upper back shows normal alignment, no bulging disk or herniation from L1-L4, degenerative disease, moderate arthritis, a bulging disk at L4 and L5, and partial sacralization at L5-S1.  DEXA scan showed osteopenia.  CT calcium score was zero.  Mammogram was normal.    Assessment & Plan  1. Back pain.  The MRI results indicate moderate arthritis and a bulging disc at L4-L5, with partial sacralization at L5-S1. The primary issue appears to be arthritis, with no surgical intervention deemed necessary at this point. A referral to G[facility], a nurse practitioner specializing in orthopedics, will be made for further evaluation and potential referral to Dr. Holm, a neurosurgeon.    2. Hip pain.  An x-ray of the left hip will be ordered to determine the cause of the pain, which could be originating from the back or the hip itself.    3. Hypertension.  Her blood pressure reading today was 122/82. She will continue with olmesartan 20 mg, and a prescription refill will be provided. She is advised that she can take her blood pressure medication and water pill concurrently.    4. Weight management.  She will be initiated on Zepbound 2.5 mg for weight loss. The prescription will be sent to [facility], and she will receive a vial for each week. She is instructed to contact us in approximately 3 weeks when she is ready for her next dose to ensure timely delivery via mail order.    5. Osteopenia.  Her DEXA scan revealed osteopenia. She is advised to engage in weight-bearing exercises such as walking to strengthen her bones.    6. Trace pericardial fluid.  A small amount of fluid around the heart was noted. This will be monitored, and a referral to cardiology will be made for further evaluation.    Follow-up  The patient will follow up in 3 months to assess her response to the weight loss  medication.    PROCEDURE  The patient underwent neck surgery in 2015.           Assessment and Plan     Diagnoses and all orders for this visit:    1. Chronic bilateral low back pain with left-sided sciatica (Primary)  -     Ambulatory Referral to Orthopedic Surgery    2. Cervical radiculopathy  -     Ambulatory Referral to Orthopedic Surgery    3. Chronic midline thoracic back pain  -     Ambulatory Referral to Orthopedic Surgery    4. Left hip pain  -     XR Hip With or Without Pelvis 2 - 3 View Left; Future    5. Body mass index (BMI) of 38.0 to 38.9 in adult  -     Tirzepatide-Weight Management (Zepbound) 2.5 MG/0.5ML solution; Inject 0.5 mL under the skin into the appropriate area as directed 1 (One) Time Per Week.  Dispense: 2 mL; Refill: 0    Other orders  -     Discontinue: Tirzepatide-Weight Management (Zepbound) 2.5 MG/0.5ML solution; Inject 0.5 mL under the skin into the appropriate area as directed 1 (One) Time Per Week.  Dispense: 2 mL; Refill: 0  -     olmesartan (Benicar) 20 MG tablet; Take 1 tablet by mouth Daily.  Dispense: 90 tablet; Refill: 1             Follow Up     No follow-ups on file.  Patient was given instructions and counseling regarding her condition or for health maintenance advice. Please see specific information pulled into the AVS if appropriate.       Patient or patient representative verbalized consent for the use of Ambient Listening during the visit with  RIMMA Munoz for chart documentation. 3/30/2025  13:37 EDT

## 2025-04-01 ENCOUNTER — TELEPHONE (OUTPATIENT)
Dept: FAMILY MEDICINE CLINIC | Facility: CLINIC | Age: 65
End: 2025-04-01

## 2025-04-01 NOTE — TELEPHONE ENCOUNTER
Called Pt to schedule but soonest available was 04/03/25 with Ezra. Suggested Pt go to UC or UC/ER on [address]. Pt voiced understanding and stated she will go to an Immediate Care/UC.

## 2025-04-01 NOTE — TELEPHONE ENCOUNTER
Hub staff attempted to follow warm transfer process and was unsuccessful     Caller: Rosalie Mcdonald    Relationship to patient: Self    Best call back number: [phone]     Patient is needing:     PATIENT THINKS SHE HAS PNEUMONIA.  SHE IS COUGHING, EAR ACHES, HEADACHES, LUNGS ARE MAKING SOUNDS.  I SUGGESTED THE ER TO HER.

## 2025-04-15 RX ORDER — TIRZEPATIDE 5 MG/.5ML
5 INJECTION, SOLUTION SUBCUTANEOUS WEEKLY
Qty: 2 ML | Refills: 2 | Status: SHIPPED | OUTPATIENT
Start: 2025-04-15

## 2025-04-18 ENCOUNTER — TELEPHONE (OUTPATIENT)
Dept: FAMILY MEDICINE CLINIC | Facility: CLINIC | Age: 65
End: 2025-04-18

## 2025-04-18 NOTE — TELEPHONE ENCOUNTER
Pt called in regards to having UTI symptoms. Pt was advised office is fully booked for the day and recommended UC for treatment. Pt declined to go to the UC and asked for an alternative OTC recommendation for treatment. Spoke with MA and advised pt OTC treatment like AZO would not treat pts UTI due to it not being an antibiotic. Pt voiced understanding and declined UC for treatment stated she will wait until Monday

## 2025-04-18 NOTE — TELEPHONE ENCOUNTER
Caller: Rosalie Mcdonald    Relationship: Self    Best call back number: [phone]     What medication are you requesting: ANTIBIOTIC AND DIFLUCAN     What are your current symptoms: UTI AND YEAST INFECTION: PAIN WITH URINATION AND AROUND VAGINAL AREA IS ITCHY AND PAINFUL     How long have you been experiencing symptoms: 2-3 DAYS     Have you had these symptoms before:    [x] Yes  [] No    Have you been treated for these symptoms before:   [x] Yes  [] No    If a prescription is needed, what is your preferred pharmacy and phone number: [id number] - [facility], [facility] [address] [address] [facility] [address] [facility] OF [address] [facility] [address] [facility] [address] [facility] [address] - [phone]  - [phone] FX     Additional notes:  PLEASE CALL AND ADVISE

## 2025-04-18 NOTE — TELEPHONE ENCOUNTER
Hub to relay:  Called patient and left voicemail to call office back and schedule an office visit.

## 2025-04-22 ENCOUNTER — TELEPHONE (OUTPATIENT)
Dept: ORTHOPEDIC SURGERY | Facility: CLINIC | Age: 65
End: 2025-04-22

## 2025-04-22 NOTE — TELEPHONE ENCOUNTER
Hub staff attempted to follow warm transfer process and was unsuccessful     Caller: Rosalie Mcdonald    Relationship to patient: Self    Best call back number:     Patient is needing: PATIENT HAD A 1PM APPOINTMENT WITH ANDREW MESA SCHEDULED FOR TODAY 4.22.25 HOWEVER HAD GOTTEN HELD UP IN TRAFFIC TO THE EXTENT SHE WOULD BE 25 MINUTES LATE AT THE [facility]. I WAS UNABLE TO WARM TRANSFER SO I WENT AHEAD AND RESCHEDULED THE APPOINTMENT TO 4.28.25 DUE TO THAT ESTIMATED TIME OF ARRIVAL BEING PAST THE 15 MINUTE SOHAN PERIOD. PLEASE CONTACT THE PATIENT IF ANYTHING NEEDS TO BE CHANGED.

## 2025-04-28 ENCOUNTER — OFFICE VISIT (OUTPATIENT)
Dept: ORTHOPEDIC SURGERY | Facility: CLINIC | Age: 65
End: 2025-04-28
Payer: COMMERCIAL

## 2025-04-28 VITALS — TEMPERATURE: 96.8 F | WEIGHT: 229 LBS | BODY MASS INDEX: 39.09 KG/M2 | HEIGHT: 64 IN

## 2025-04-28 DIAGNOSIS — M46.1 SACROILIAC INFLAMMATION: ICD-10-CM

## 2025-04-28 DIAGNOSIS — M54.42 CHRONIC BILATERAL LOW BACK PAIN WITH BILATERAL SCIATICA: ICD-10-CM

## 2025-04-28 DIAGNOSIS — G89.29 CHRONIC BILATERAL LOW BACK PAIN WITH BILATERAL SCIATICA: ICD-10-CM

## 2025-04-28 DIAGNOSIS — M70.62 GREATER TROCHANTERIC BURSITIS OF LEFT HIP: Primary | ICD-10-CM

## 2025-04-28 DIAGNOSIS — M54.41 CHRONIC BILATERAL LOW BACK PAIN WITH BILATERAL SCIATICA: ICD-10-CM

## 2025-04-28 PROCEDURE — 99214 OFFICE O/P EST MOD 30 MIN: CPT | Performed by: NURSE PRACTITIONER

## 2025-04-28 RX ORDER — ALBUTEROL SULFATE 90 UG/1
2 INHALANT RESPIRATORY (INHALATION)
COMMUNITY
Start: 2025-04-01 | End: 2025-05-01

## 2025-04-28 NOTE — PROGRESS NOTES
Patient Name: Rosalie Mcdonald   YOB: 1960  Referring Primary Care Physician: Soraya Esteves APRN      Chief Complaint:    Chief Complaint   Patient presents with    Lumbar Spine - Initial Evaluation, Pain        Previous Treatment:   Former Dr. Green patient, last seen for neck pain on 07/06/2016     PT for LBP? NO      MRI:   03/23/2025 - MRI of L-Spine W/O ()   11/19/2023 - MRI of T&C Spine W/O ()      Pain Management:   Patient had an appointment scheduled on 03/24/2025 but it was canceled.    Spine Surgery:    11/05/2015 - 2 level anterior cervical discectomy and fusion            Back Pain  This is a chronic problem. The current episode started more than 1 year ago. The problem occurs intermittently. The problem has been worse since onset. The pain is present in the lumbar spine. The quality of the pain is described as aching (popping, Tightness). The pain radiates to the right foot and left thigh (lt hip, Referring down the posterior aspect of the LLE). The pain is at a severity of 0/10 (8/10 at worse). The pain is severe. The symptoms are aggravated by bending. Associated symptoms include bladder incontinence and leg pain. Pertinent negatives include no bowel incontinence, numbness, tingling or weakness. Risk factors include history of cancer (2018 - Colon cancer, h/o skin cancer?). She has tried NSAIDs (Oral steroids) for the symptoms. The treatment provided mild relief.        HPI:  Rosalie Mcdonald is a 65 y.o. female who presents to [facility] for evaluation of complaints.  Complains of intermittent chronic low back pain referring occasionally to the left buttock and lateral hip and rarely down the left lower extremity.  She has secondary complaint of parascapular pain.  She reports history of [place].  No associated injury related to her back pain.  She also reports that her spine will pop with minimal movement which has not happened in the  past.  This is a previously established patient to the practice who had cervical ACDF with Dr. Green in 2015, new to me.  She is unaccompanied today.  Prior pertinent records were reviewed.    PFSH:  See attached    ROS: As per HPI, otherwise negative    Objective:      65 y.o. female  Body mass index is 39.31 kg/m²., 104 kg (229 lb), @HT@  Vitals:    04/28/25 1405   Temp: 96.8 °F (36 °C)     Pain Score    04/28/25 1405   PainSc: 0-No pain  Comment: 8/10 at worse   PainLoc: Back            Spine Musculoskeletal Exam    Gait    Gait is normal.    Palpation    Thoracolumbar    Right      Muscle tone: normal    Left      Muscle tone: normal    Strength    Thoracolumbar    Thoracolumbar motor exam is normal.       Sensory    Thoracolumbar    Thoracolumbar sensation is normal.    Reflexes    Right      Quadriceps: 2/4      Achilles: 2/4     Left      Quadriceps: 2/4      Achilles: 2/4    Special Tests    Thoracolumbar      Right      SLR: back pain      Left      SLR: no back or leg pain    General      Constitutional: severely obese, well-developed and well-nourished    Scleral icterus: no    Labored breathing: no    Psychiatric: normal mood and affect and no acute distress    Neurological: alert and oriented x3    Skin: intact        IMAGING:     lumbar MRI 03/23/2025 is reviewed patient reveals transitional anatomy with sacralized L5 segment and pseudoarthrosis on the left, posterior disc changes, facet arthropathy L4-5 contributing to minimal central stenosis, no high-grade canal or foraminal narrowing at any level.  Agree with report    Assessment:           Diagnoses and all orders for this visit:    1. Greater trochanteric bursitis of left hip (Primary)  -     Ambulatory Referral to Physical Therapy for Evaluation & Treatment    2. Sacroiliac inflammation  -     Ambulatory Referral to Physical Therapy for Evaluation & Treatment    3. Chronic bilateral low back pain with bilateral sciatica  -     Ambulatory  Referral to Physical Therapy for Evaluation & Treatment             Plan:  She has multiple joint complaints today with a lot of overlapping symptoms.  She has axial low back pain referring into the left buttock and lateral hip.  Could have some SI joint mediated pain versus bursitis.  Will refer to physical therapy to work through her current pain symptoms.  She does try to stay active and works out at the gym but does find some of the exercises aggravating.  Recommend that she discuss a gym workout with physical therapy, work on core exercises, stretching and strengthening.  Will see her back in 2 months to see how she is doing with physical therapy.  Depending on residual symptoms, may consider thoracic or cervical MRI or referral to one of our hip orthopedist for a left GTB injection.    Return in about 2 months (around 6/28/2025).    EMR Dragon/Transcription Disclaimer:   Much of this encounter note is an electronic transcription/translation of spoken language to printed text utilizing Dragon dictation.  Red flags have been discussed at this or previous visits to include but not limited weakness in extremities, worsening pain that does not respond to conservative treatment and bowel or bladder dysfunction. These are reasons to present to ER and patient has been informed.    The diagnosis(es), natural history, pathophysiology and treatment for diagnosis(es) were discussed. Opportunity given and questions answered. Biomechanics of pertinent body areas discussed.    EXERCISES:  Advice on benefits of, and types of regular/moderate exercise pertaining to diagnosis.  Continue HEP. For back or neck pain, recommend pilates and or yoga as tolerated. Generally it is best to start any new exercise under the guidance of a  or therapist.   MEDICATIONS:  When prescribe, the risks, benefits, warnings,side effects and alternatives of medications discussed. Advised against long term use of narcotics.   PAIN CONTROL:   Cold, heat, OTC lidocaine patches and/or ointment as needed. Avoid direct skin contact with ice. Ice 15-20 minutes 3-4 times daily as needed. For SI joint pain, recommend ice bath in water about 50 degrees for 5 consecutive days, add ice slowly to help with adjustment and may cover with warm towel or robe to help with cold tolerance. If using lidocaine, do not apply heat in conjunction as this can cause a burn.   MEDICAL RECORDS reviewed from other provider(s) for past and current medical history pertinent to this visit..

## 2025-05-14 ENCOUNTER — TREATMENT (OUTPATIENT)
Dept: PHYSICAL THERAPY | Facility: CLINIC | Age: 65
End: 2025-05-14
Payer: COMMERCIAL

## 2025-05-14 DIAGNOSIS — R68.89 ACTIVITY INTOLERANCE: ICD-10-CM

## 2025-05-14 DIAGNOSIS — M70.62 TROCHANTERIC BURSITIS OF LEFT HIP: Primary | ICD-10-CM

## 2025-05-14 DIAGNOSIS — R29.898 BILATERAL LEG WEAKNESS: ICD-10-CM

## 2025-05-14 DIAGNOSIS — M54.42 CHRONIC BILATERAL LOW BACK PAIN WITH BILATERAL SCIATICA: ICD-10-CM

## 2025-05-14 DIAGNOSIS — M54.41 CHRONIC BILATERAL LOW BACK PAIN WITH BILATERAL SCIATICA: ICD-10-CM

## 2025-05-14 DIAGNOSIS — G89.29 CHRONIC BILATERAL LOW BACK PAIN WITH BILATERAL SCIATICA: ICD-10-CM

## 2025-05-14 NOTE — PROGRESS NOTES
"Physical Therapy Initial Evaluation and Plan of Care  [facility] [place]   [address], [address]  [place] [address]  P: [phone]  F: [phone]    Patient: Rosalie Mcdonald   : 1960  Diagnosis/ICD-10 Code:  Trochanteric bursitis of left hip [M70.62]  Referring practitioner: RIMMA Strickland  Date of Initial Visit: 2025  Today's Date: 2025  Patient seen for 1 session         Visit Diagnoses:    ICD-10-CM ICD-9-CM   1. Trochanteric bursitis of left hip  M70.62 726.5   2. Chronic bilateral low back pain with bilateral sciatica  M54.42 724.2    M54.41 724.3    G89.29 338.29   3. Bilateral leg weakness  R29.898 729.89   4. Activity intolerance  R68.89 780.99       PMHx Reviewed : 2025      Subjective Evaluation    History of Present Illness  Mechanism of injury: Hx:   Pt reports to clinic for IE of L hip bursitis (trochanteric) and chronic LBP w/ radicular s/s. Pt reports she has been dealing w/ the hip issue for the last 6 months and LB for the last year or so. Pt notes pain in her back essentially all the time. Pt notes she can \"hear her back pop\" and this \"scares her\". Pt states \"something terrible is going on in her back\". Pt is \"scared\" something is wrong. Pt reports some numbness in her R leg.   Pt reports having bronchitis about 3 weeks ago. Pt reports a UTI as well.     PMH includes:  -Guillain barre syndrome (2015), 10 day hospitalization   - cervical fusion      Pt reported difficulties:  - self reported fxn status = 25/100%  - holding her granddaughter (35#)  - cleaning activities   - sleep   - going up stairs (a lot of pain in her L knee, crawls up)  - most activity     Hobbies (impacted by dysfxn):  - N/A      Patient Occupation: retired Quality of life: good    Pain  Current pain ratin  At best pain ratin  At worst pain ratin  Quality: constant.  Relieving factors: medications  Aggravating factors: stairs, lifting, " ambulation and sleeping  Progression: no change    Social Support  Lives with: spouse    Patient Goals  Patient goals for therapy: decreased pain, increased motion and independence with ADLs/IADLs             Objective        Special Questions      Additional Special Questions  No red flags present       Neurological Testing     Sensation     Lumbar   Left   Intact: light touch    Right   Intact: light touch    Reflexes   Left   Patellar (L4): normal (2+)    Right   Patellar (L4): normal (2+)    Active Range of Motion     Lumbar   Flexion: WFL  Extension: 20 degrees with pain  Left lateral flexion: 25 degrees   Right lateral flexion: 25 degrees     Strength/Myotome Testing     Left Hip   Planes of Motion   Flexion: 4  Abduction: 5  Adduction: 5  External rotation: 4+  Internal rotation: 4+    Right Hip   Planes of Motion   Flexion: 4  Abduction: 5  Adduction: 5  External rotation: 4+  Internal rotation: 4+    Left Knee   Flexion: 5  Extension: 5    Right Knee   Flexion: 5  Extension: 5    Tests     Lumbar     Left   Positive passive SLR.     Right   Negative passive SLR.           Assessment & Plan       Assessment  Impairments: abnormal gait, abnormal or restricted ROM, activity intolerance, impaired physical strength, lacks appropriate home exercise program and pain with function   Functional limitations: carrying objects, lifting, sleeping, walking, pulling, pushing, uncomfortable because of pain, sitting, standing, stooping and unable to perform repetitive tasks   Assessment details: Pt presents to clinic for IE of L hip bursitis (trochanteric) and chronic LBP w/ radicular s/s. Pt demo clinical presentation of core musculature weakness, decreased lumbar ROM, decreased hip strength and ROM, and pain/activity intolerance. Pt was pos for L SLR special test. Pt denied s/s of active cancer, AAA, cauda equina syndrome as potential causes of LBP. Due to pt PMH of GBS, pt advised to monitor her s/s, if persist, worsen,  or develop new s/s in line w/ GBS to seek care at ED. Pt verbalized understanding of presented information.Pt will benefit from skilled PT services at this time to address found dysfxn in order to achieve outlined goals in POC for return to PLOF.   Prognosis: good    Goals  Plan Goals: Goals  Plan Goals: STG [achieve in 4 weeks]  1. Pt will be compliant w/ HEP and attendance w/ scheduled PT services.   2. Pt will improve LE MMT scores to pain free 4/5 for fxn strength w/ IADLs.   3. Pt will increase lumbar ROM by 10 degrees or more for improved fxn mobility w/ IADLs.   4. Pt will report reduced pain from 8/10 at worst to 6/10 or less on pain scale in order to improve pt QoL and progress POC PT interventions.      LTG [achieve in 8 weeks]   1.  Pt will improve LE MMT scores to pain free 4+/5 or more for fxn strength w/ IADLs.  2. Pt will improve LEFS score by 20% or more  to show minimal to no lvl of dissability for pt QoL and return to PLOF.   3. Pt will report a 20% increase or more in her self reported fxn status in order to demo improved fxn performance of IADLs and QoL.   4.  Pt will be negative for L SLR special test indicating improvement/resolution of c/c and efficacy of skilled PT services.     Plan  Therapy options: will be seen for skilled therapy services  Planned modality interventions: cryotherapy, dry needling, iontophoresis, TENS and traction  Planned therapy interventions: abdominal trunk stabilization, body mechanics training, flexibility, functional ROM exercises, gait training, home exercise program, joint mobilization, manual therapy, motor coordination training, neuromuscular re-education, postural training, soft tissue mobilization, spinal/joint mobilization, strengthening, stretching and therapeutic activities  Frequency: 1x week  Duration in weeks: 8  Treatment plan discussed with: patient          Manual Therapy:     0     mins  48595;  Therapeutic Exercise:     24     mins  77452;      Neuromuscular Piedad:       8    mins  88256;    Therapeutic Activity:      8     mins  64253;     Gait Trainin     mins  56736;     Ultrasound:      0     mins  35543;    Electrical Stimulation:     0     mins  64240 ( );  Dry Needling      0     mins self-pay  Traction      0     mins 26958  Canalith Repositioning    0     mins 25124      Timed Treatment:   40   mins   Total Treatment:     60   mins      PT: Roni Blackburn PT     License Number: [id number]  Electronically signed by Roni Blackburn PT, 25, 12:10 PM EDT    Certification Period: 2025 thru 2025  I certify that the therapy services are furnished while this patient is under my care.  The services outlined above are required by this patient, and will be reviewed every 90 days.         Physician Signature:__________________________________________________    PHYSICIAN: Gema Torres APRN  NPI: [id number]                                      DATE:      Please sign in Epic or return via fax to [email or web address] . Thank you, [facility].

## 2025-05-19 ENCOUNTER — TELEPHONE (OUTPATIENT)
Dept: FAMILY MEDICINE CLINIC | Facility: CLINIC | Age: 65
End: 2025-05-19
Payer: COMMERCIAL

## 2025-05-19 NOTE — TELEPHONE ENCOUNTER
Lvm for pt to call back to reschedule appt on 6/26/25 due to provider being out of the office until July. Advised would be removed from the schedule.

## 2025-05-27 RX ORDER — HYDROCHLOROTHIAZIDE 25 MG/1
25 TABLET ORAL DAILY
Qty: 90 TABLET | Refills: 1 | Status: SHIPPED | OUTPATIENT
Start: 2025-05-27

## 2025-05-30 ENCOUNTER — TELEPHONE (OUTPATIENT)
Dept: FAMILY MEDICINE CLINIC | Facility: CLINIC | Age: 65
End: 2025-05-30
Payer: COMMERCIAL

## 2025-05-30 NOTE — TELEPHONE ENCOUNTER
Hub staff attempted to follow warm transfer process and was unsuccessful     Caller: Rosalie Mcdonald    Relationship to patient: Self    Best call back number:     [phone]          Patient is needing:     PATIENT IS WANTING TO SPEAK WITH THE OFFICE IN REGARDS TO RESCHEDULING HER APPOINTMENT WITH ROSSI SCHNEIDER

## 2025-06-02 RX ORDER — TIRZEPATIDE 5 MG/.5ML
5 INJECTION, SOLUTION SUBCUTANEOUS WEEKLY
Qty: 2 ML | Refills: 2 | Status: CANCELLED | OUTPATIENT
Start: 2025-06-02

## 2025-06-02 RX ORDER — TIRZEPATIDE 5 MG/.5ML
5 INJECTION, SOLUTION SUBCUTANEOUS WEEKLY
Qty: 2 ML | Refills: 2 | Status: SHIPPED | OUTPATIENT
Start: 2025-06-02

## 2025-06-02 NOTE — TELEPHONE ENCOUNTER
Called patient and scheduled a med check appointment with partner provider Robbi. For 6/13/25. Patient is requesting a refill of her Zepbound. Please advise. TY

## 2025-06-05 ENCOUNTER — TELEPHONE (OUTPATIENT)
Dept: PHYSICAL THERAPY | Facility: CLINIC | Age: 65
End: 2025-06-05

## 2025-06-17 ENCOUNTER — HOSPITAL ENCOUNTER (OUTPATIENT)
Dept: GENERAL RADIOLOGY | Facility: HOSPITAL | Age: 65
Discharge: HOME | End: 2025-06-17
Admitting: NURSE PRACTITIONER
Payer: COMMERCIAL

## 2025-06-17 ENCOUNTER — OFFICE VISIT (OUTPATIENT)
Dept: FAMILY MEDICINE CLINIC | Facility: CLINIC | Age: 65
End: 2025-06-17
Payer: COMMERCIAL

## 2025-06-17 VITALS
DIASTOLIC BLOOD PRESSURE: 86 MMHG | HEIGHT: 64 IN | SYSTOLIC BLOOD PRESSURE: 126 MMHG | OXYGEN SATURATION: 98 % | HEART RATE: 75 BPM | BODY MASS INDEX: 37.39 KG/M2 | WEIGHT: 219 LBS

## 2025-06-17 DIAGNOSIS — N94.10 DYSPAREUNIA, FEMALE: ICD-10-CM

## 2025-06-17 DIAGNOSIS — Z76.89 ENCOUNTER FOR WEIGHT MANAGEMENT: ICD-10-CM

## 2025-06-17 DIAGNOSIS — M25.551 ACUTE RIGHT HIP PAIN: ICD-10-CM

## 2025-06-17 DIAGNOSIS — Z00.00 ANNUAL PHYSICAL EXAM: Primary | ICD-10-CM

## 2025-06-17 PROCEDURE — 73502 X-RAY EXAM HIP UNI 2-3 VIEWS: CPT

## 2025-06-17 RX ORDER — TIRZEPATIDE 7.5 MG/.5ML
7.5 INJECTION, SOLUTION SUBCUTANEOUS WEEKLY
Qty: 2 ML | Refills: 3 | Status: SHIPPED | OUTPATIENT
Start: 2025-06-17

## 2025-06-17 RX ORDER — OLMESARTAN MEDOXOMIL 5 MG/1
10 TABLET ORAL DAILY
Qty: 60 TABLET | Refills: 3 | Status: SHIPPED | OUTPATIENT
Start: 2025-06-17

## 2025-06-19 ENCOUNTER — TREATMENT (OUTPATIENT)
Dept: PHYSICAL THERAPY | Facility: CLINIC | Age: 65
End: 2025-06-19
Payer: COMMERCIAL

## 2025-06-19 DIAGNOSIS — M70.62 TROCHANTERIC BURSITIS OF LEFT HIP: Primary | ICD-10-CM

## 2025-06-19 NOTE — PROGRESS NOTES
Physical Therapy Re-eval/Discharge   [facility] [place]   [address], [address]  [place] [address]  P: [phone]  F: [phone]    Patient: Rosalie Mcdonald   : 1960  Referring practitioner: RIMMA Strickland  Date of Initial Visit: Type: THERAPY  Noted: 2025  Today's Date: 2025  Patient seen for 2 sessions       Visit Diagnoses:    ICD-10-CM ICD-9-CM   1. Trochanteric bursitis of left hip  M70.62 726.5         Rosalie Mcdonald reports: Pt reports she is basically the same as when she started. Pt notes her pain has been on going and has not seemed to be helped w/ PT.  Pt has a f/u w/ ortho next week. No new/other complaints/comments noted.       Subjective   Pt reported difficulties: SAME   - self reported fxn status = 25/100%  - holding her granddaughter (35#)  - cleaning activities   - sleep   - going up stairs (a lot of pain in her L knee, crawls up)  - most activity      Objective   See Exercise, Manual, and Modality Logs for complete treatment.   Active Range of Motion      Lumbar   Flexion: WFL  Extension: 20 degrees with pain  Left lateral flexion: 25 degrees   Right lateral flexion: 25 degrees      Strength/Myotome Testing      Left Hip   Planes of Motion   Flexion: 4  Abduction: 5  Adduction: 5  External rotation: 4+  Internal rotation: 4+     Right Hip   Planes of Motion   Flexion: 4  Abduction: 5  Adduction: 5  External rotation: 4+  Internal rotation: 4+     Left Knee   Flexion: 5  Extension: 5     Right Knee   Flexion: 5  Extension: 5     Tests      Lumbar      Left   Positive passive SLR.      Right   Negative passive SLR.         Assessment/Plan  Pt was re-evaluated for progress w/in this PoC. Pt has not made any improvements w/ her HEP or w/ this PoC. Pt compliance hindered by subjective reports of unchanged s/s. Pt is d/c from skilled PT services at this time secondary to poor response to interventions. w/ updated HEP. Therapist and patient  are in agreement w/ this plan.        Goals = UNMET   Plan Goals: Goals  Plan Goals: STG [achieve in 4 weeks]  1. Pt will be compliant w/ HEP and attendance w/ scheduled PT services.   2. Pt will improve LE MMT scores to pain free 4/5 for fxn strength w/ IADLs.   3. Pt will increase lumbar ROM by 10 degrees or more for improved fxn mobility w/ IADLs.   4. Pt will report reduced pain from 8/10 at worst to 6/10 or less on pain scale in order to improve pt QoL and progress POC PT interventions.      LTG [achieve in 8 weeks]   1.  Pt will improve LE MMT scores to pain free 4+/5 or more for fxn strength w/ IADLs.  2. Pt will improve LEFS score by 20% or more  to show minimal to no lvl of dissability for pt QoL and return to PLOF.   3. Pt will report a 20% increase or more in her self reported fxn status in order to demo improved fxn performance of IADLs and QoL.   4.  Pt will be negative for L SLR special test indicating improvement/resolution of c/c and efficacy of skilled PT services.     Timed:         Manual Therapy:    0     mins  25951;     Therapeutic Exercise:    8     mins  38087;     Neuromuscular Piedad:    8    mins  49019;    Therapeutic Activity:     0     mins  93999;     Gait Trainin     mins  04762;     Ultrasound:     0     mins  39128;    Ionto                               0    mins  43903  Self Care                       0     mins  17991  Traction     0     mins 72607      Un-Timed:  Canalith Repos    0     mins 69056  Electrical Stimulation:    0     mins  79353 (MC );  Dry Needling     0     mins self-pay  Traction     0     mins 09342  PT Re-eval                       10         mins 06745       Timed Treatment:   16   mins   Total Treatment:     26   mins    Roni Blackburn, PT  [place] License #: [id number]    Physical Therapist

## 2025-06-22 NOTE — PROGRESS NOTES
Chief Complaint  Annual Exam and Hip Pain    Subjective        Rosalie Mcdonald presents to [facility]  Hip Pain       History of Present Illness  The patient presents for a follow-up visit.    She reports a weight loss from 233 to 219 pounds, attributing this to her use of Zepbound, which she has been on for 3 months. She is obtaining the medication from [facility] and has not experienced any adverse effects. She expresses a desire to increase the dosage slightly. Due to her overall health condition, she has been unable to attend the gym or perform physical therapy exercises. Additionally, she has not been able to take her diuretic or consume water for the past 2 days, resulting in a further weight reduction to 214 pounds.    She has been monitoring her blood pressure daily, which has consistently read around 110/76. However, she notes a recent increase in her blood pressure, which she attributes to stress from a family matter. She discontinued her antihypertensive medication a few weeks ago due to concerns about potential hypotension but continues to take her diuretic, although she missed the last 2 days. She is currently on olmesartan 20 mg, reduced from an initial dose of 40 mg.    She experiences severe pain during sexual intercourse, which she believes is due to uterine prolapse. She does not report any vaginal dryness. She has not had a Pap smear in several years and has noticed a bright red dot in her vaginal area. She also reports increased pressure on her bladder and a recent urinary tract infection that she managed at home.    She has been experiencing right hip pain, which she initially thought was related to her pancreas. The pain has since localized to her hip and has improved over the past few days. She describes the pain as throbbing and severe enough to cause shortness of breath. She also reports numbness in her leg following an injection, which she compares to symptoms she  "experienced during a previous episode of Guillain-Wyanet syndrome. She has been unable to attend physical therapy sessions due to her overall health condition.          Objective   Vital Signs:  /86 (BP Location: Right arm, Patient Position: Sitting, Cuff Size: Large Adult)   Pulse 75   Ht 162.6 cm (64\")   Wt 99.3 kg (219 lb)   SpO2 98%   BMI 37.59 kg/m²   Estimated body mass index is 37.59 kg/m² as calculated from the following:    Height as of this encounter: 162.6 cm (64\").    Weight as of this encounter: 99.3 kg (219 lb).               Physical Exam  Vitals reviewed.   Constitutional:       General: She is not in acute distress.     Appearance: She is well-developed. She is not diaphoretic.   HENT:      Head: Normocephalic and atraumatic.      Right Ear: Tympanic membrane, ear canal and external ear normal.      Left Ear: Tympanic membrane, ear canal and external ear normal.      Nose: Nose normal.      Mouth/Throat:      Mouth: Mucous membranes are moist.      Pharynx: Oropharynx is clear. Uvula midline. No oropharyngeal exudate.   Eyes:      Conjunctiva/sclera: Conjunctivae normal.      Pupils: Pupils are equal, round, and reactive to light.   Cardiovascular:      Rate and Rhythm: Normal rate and regular rhythm.      Heart sounds: Normal heart sounds. No murmur heard.     No friction rub. No gallop.   Pulmonary:      Effort: Pulmonary effort is normal. No respiratory distress.      Breath sounds: Normal breath sounds. No wheezing or rales.   Abdominal:      General: Bowel sounds are normal. There is no distension.      Palpations: Abdomen is soft.      Tenderness: There is no abdominal tenderness.   Musculoskeletal:      Cervical back: Neck supple.   Lymphadenopathy:      Cervical: No cervical adenopathy.   Skin:     General: Skin is warm and dry.   Neurological:      Mental Status: She is alert and oriented to person, place, and time.   Psychiatric:         Mood and Affect: Mood normal. "       Physical Exam      Result Review :          Results      Assessment & Plan  1. Weight management.  - Her weight loss journey is progressing well with the use of Zepbound, which appears to be more effective than Wegovy.  - Increased pain and limited mobility.  - The current dosage of Zepbound will be increased to 7.5 mg, with a prescription for 3 refills provided.  - If she wishes to further increase the dosage, she can communicate this via message, and a new prescription will be issued accordingly.    2. Hypertension.  - Her blood pressure readings have shown significant improvement compared to previous records.  - Blood pressure consistently around 110/76.  - The dosage of olmesartan will be reduced to 10 mg, with a prescription for 30 days provided.  - If she experiences any episodes of dizziness, discontinuation of the medication will be considered.    3. Dyspareunia.  - The dyspareunia could be a result of atrophy due to hormonal changes.  - Vaginal topical or estrogen therapy may be considered.  - A referral to Dr. Davila will be made for further evaluation and management.  - A Pap smear will also be conducted during this visit.    4. Right hip pain.  - Increased pain and limited mobility.  - An x-ray of the right hip will be ordered to investigate the cause of the pain.  - The pain has been severe, occasionally reaching a level of 10.  - The x-ray will be conducted to ensure comprehensive evaluation.           Assessment and Plan     Diagnoses and all orders for this visit:    1. Annual physical exam (Primary)    2. Dyspareunia, female  -     Ambulatory Referral to Gynecology    3. Acute right hip pain  -     XR Hip With or Without Pelvis 2 - 3 View Right; Future    4. Encounter for weight management    Other orders  -     Tirzepatide-Weight Management (Zepbound) 7.5 MG/0.5ML solution; Inject 0.5 mL under the skin into the appropriate area as directed 1 (One) Time Per Week.  Dispense: 2 mL; Refill:  3  -     olmesartan (BENICAR) 5 MG tablet; Take 2 tablets by mouth Daily.  Dispense: 60 tablet; Refill: 3        Information/counseling provided to the patient regarding periodic lexie maintenance recommendations, including but not limited to immunizations, diet/exercise/healthy lifestyle, laboratory, and other screenings. BMI is discussed. Appropriate exercise, diet, and weight plans are discussed.         Follow Up     No follow-ups on file.  Patient was given instructions and counseling regarding her condition or for health maintenance advice. Please see specific information pulled into the AVS if appropriate.       Patient or patient representative verbalized consent for the use of Ambient Listening during the visit with  RIMMA Munoz for chart documentation. 6/22/2025  11:13 EDT

## 2025-06-30 ENCOUNTER — OFFICE VISIT (OUTPATIENT)
Dept: ORTHOPEDIC SURGERY | Facility: CLINIC | Age: 65
End: 2025-06-30
Payer: COMMERCIAL

## 2025-06-30 VITALS — BODY MASS INDEX: 36.7 KG/M2 | WEIGHT: 215 LBS | TEMPERATURE: 96.8 F | HEIGHT: 64 IN

## 2025-06-30 DIAGNOSIS — R20.0 NUMBNESS AND TINGLING OF RIGHT LEG: ICD-10-CM

## 2025-06-30 DIAGNOSIS — R20.2 NUMBNESS AND TINGLING OF RIGHT LEG: ICD-10-CM

## 2025-06-30 DIAGNOSIS — M25.551 ACUTE RIGHT HIP PAIN: Primary | ICD-10-CM

## 2025-06-30 PROCEDURE — 99213 OFFICE O/P EST LOW 20 MIN: CPT | Performed by: NURSE PRACTITIONER

## 2025-06-30 NOTE — PROGRESS NOTES
"Patient Name: Rosalie Mcdonald   YOB: 1960  Referring Primary Care Physician: Soraya Esteves APRN      Chief Complaint:    Chief Complaint   Patient presents with    Lumbar Spine - Pain, Follow-up            HPI:  Rosalie Mcdonald is a 65 y.o. female who presents to [facility] for follow-up.  She was last seen 2 months ago for primary complaint of low back pain referring into the left buttock and hip and occasionally down the left lower extremity.  She has completed some physical therapy since last visit.  She does report therapy has helped the back pain, unfortunately she now has developed acute right lateral hip pain.  She reports history of bilateral greater trochanteric bursitis in the past.  She says this pain feels a little higher up but does point specifically to the right lateral hip.  She says the pain will come on very suddenly and \"take her breath away\".  She has also experienced new numbness and tingling of the right leg and no specific dermatomal pattern.    PFSH:  See attached    ROS: As per HPI, otherwise negative    Objective:      65 y.o. female  Body mass index is 37.49 kg/m²., 97.5 kg (215 lb), @HT@  Vitals:    06/30/25 1320   Temp: 96.8 °F (36 °C)     Pain Score    06/30/25 1320   PainSc: 3    PainLoc: Back            Spine Musculoskeletal Exam    Palpation    Thoracolumbar    Tenderness: present      Greater trochanter: right    Right      Muscle tone: normal    Left      Muscle tone: normal    Sensory    Thoracolumbar    Thoracolumbar sensation is normal.    Reflexes    Right      Quadriceps: 2/4      Achilles: 2/4     Left      Quadriceps: 2/4      Achilles: 2/4    Special Tests    Thoracolumbar      Right      SLR: no back or leg pain      Left      SLR: no back or leg pain    Spine special tests additional comments: Positive right Loggroll and FADIR.         IMAGING:     No new imaging today  Hip films 06/17/25 revealed no acute osseous " abnormality or AVN. The right hip joint is similar to the previous examination. Bone hypertrophy along the superolateral acetabulum as before. Joint width preserved. Small amount of spurring along the greater trochanter     Assessment:           Diagnoses and all orders for this visit:    1. Acute right hip pain (Primary)  -     Ambulatory Referral to Orthopedic Surgery    2. Numbness and tingling of right leg  -     Ambulatory Referral to Neurology             Plan:  As discussed at last visit if still having hip pain we would have her evaluated with one of our hip specialist but now the pain has moved to the right worse than left hip.  Her back pain is somewhat better with physical therapy.  We discussed perhaps trialing greater trochanteric bursa injection as a diagnostic tool and treatment with one of her hip specialist.  She is a bit reluctant to consider injections, but willing to discuss in the office.  Recent hip imaging did show small amount of spurring along the greater trochanter to her on the right.  With the unexplained right leg numbness and tingling and with her history of [place] Barr[place], we will also refer her to neurology for evaluation.  Lumbar MRI recently done does not explain her pattern of current pain symptoms.  We did discuss for the back pain she may be a candidate for facet ablations through pain management, but the back pain is not her primary focus at this point so we will follow-up as needed.    Return if symptoms worsen or fail to improve.    EMR Dragon/Transcription Disclaimer:   Much of this encounter note is an electronic transcription/translation of spoken language to printed text utilizing Dragon dictation.  Red flags have been discussed at this or previous visits to include but not limited weakness in extremities, worsening pain that does not respond to conservative treatment and bowel or bladder dysfunction. These are reasons to present to ER and patient has been informed.    The  diagnosis(es), natural history, pathophysiology and treatment for diagnosis(es) were discussed. Opportunity given and questions answered. Biomechanics of pertinent body areas discussed.    EXERCISES:  Advice on benefits of, and types of regular/moderate exercise pertaining to diagnosis.  Continue HEP. For back or neck pain, recommend pilates and or yoga as tolerated. Generally it is best to start any new exercise under the guidance of a  or therapist.   MEDICATIONS:  When prescribe, the risks, benefits, warnings,side effects and alternatives of medications discussed. Advised against long term use of narcotics.   PAIN CONTROL:  Cold, heat, OTC lidocaine patches and/or ointment as needed. Avoid direct skin contact with ice. Ice 15-20 minutes 3-4 times daily as needed. For SI joint pain, recommend ice bath in water about 50 degrees for 5 consecutive days, add ice slowly to help with adjustment and may cover with warm towel or robe to help with cold tolerance. If using lidocaine, do not apply heat in conjunction as this can cause a burn.   MEDICAL RECORDS reviewed from other provider(s) for past and current medical history pertinent to this visit..

## 2025-07-08 ENCOUNTER — OFFICE VISIT (OUTPATIENT)
Dept: ORTHOPEDIC SURGERY | Facility: CLINIC | Age: 65
End: 2025-07-08
Payer: COMMERCIAL

## 2025-07-08 VITALS — HEIGHT: 62 IN | TEMPERATURE: 98.1 F | BODY MASS INDEX: 39.64 KG/M2 | WEIGHT: 215.4 LBS

## 2025-07-08 DIAGNOSIS — M70.62 GREATER TROCHANTERIC BURSITIS OF LEFT HIP: Primary | ICD-10-CM

## 2025-07-08 DIAGNOSIS — M76.31 IT BAND SYNDROME, RIGHT: ICD-10-CM

## 2025-07-08 PROCEDURE — 99213 OFFICE O/P EST LOW 20 MIN: CPT | Performed by: NURSE PRACTITIONER

## 2025-07-08 RX ORDER — METHYLPREDNISOLONE 4 MG/1
TABLET ORAL
Qty: 21 TABLET | Refills: 0 | Status: SHIPPED | OUTPATIENT
Start: 2025-07-08

## 2025-07-08 NOTE — PROGRESS NOTES
Patient: Rosalie Mcdonald  YOB: 1960 65 y.o. female  Medical Record Number: [id number]    Chief Complaints:   Chief Complaint   Patient presents with    Right Hip - Initial Evaluation, Pain       History of Present Illness:Rosalie Mcdonald is a 65 y.o. female who presents with complaints of having right hip pain that is chronic in nature.  Patient states that she has known hip bursitis that she has been dealing with but the pain feels different.  She states it starts out a little higher and goes down from her hip down to her knee.  Says the symptoms started about 6 to 7 weeks ago without any known injury.  Patient describes the pain as an achy throbbing pain and sometimes sharp and shooting.  Certain positions or movements can trigger the symptoms.  She denies any recent fall injury or trauma.  Denies any signs or symptoms of infection, and she is without any other significant complaints today.    Allergies:   Allergies   Allergen Reactions    Latex Itching       Medications:   Current Outpatient Medications   Medication Sig Dispense Refill    hydroCHLOROthiazide 25 MG tablet TAKE 1 TABLET BY MOUTH DAILY 90 tablet 1    olmesartan (BENICAR) 5 MG tablet Take 2 tablets by mouth Daily. 60 tablet 3    Tirzepatide-Weight Management (Zepbound) 7.5 MG/0.5ML solution Inject 0.5 mL under the skin into the appropriate area as directed 1 (One) Time Per Week. 2 mL 3    methylPREDNISolone (MEDROL) 4 MG dose pack Use as directed by package instructions 21 tablet 0     No current facility-administered medications for this visit.         The following portions of the patient's history were reviewed and updated as appropriate: allergies, current medications, past family history, past medical history, past social history, past surgical history and problem list.    Review of Systems:   Pertinent positives/negative listed in HPI above    Physical Exam:   Vitals:    07/08/25 1352   Temp: 98.1 °F (36.7 °C)   Weight: 97.7 kg  "(215 lb 6.4 oz)   Height: 157.5 cm (62\")   PainSc: 2    PainLoc: Hip       General: A and O x 3, ASA, NAD      Hip:  right    LEG ALIGNMENT:     Neutral   ,    equal leg lengths    GAIT:     Nonantalgic    SKIN:     No abnormality    RANGE OF MOTION:      Full without joint irritability    STRENGTH:     5 / 5    hip flexion and abduction    DISTAL PULSES:    Paplable    DISTAL SENSATION :   Intact    LYMPHATICS:     No   lymphadenopathy    OTHER:          - Negative Stinchfeld test      - Negative log roll      +Tenderness to palpation trochanteric bursa         Radiology:  Xrays 2views right hip (AP bilateral hips, and lateral of the hip) taken at an [facility] institution were reviewed  demonstrating  minimal arthritic findings with trochanteric spurring noted indicative of bursitis.  No new x-rays were taken today for comparison purposes.    Assessment/Plan: Trochanteric bursitis/IT band syndrome    Treatment options as well as imaging results were discussed with the patient.  Based off the patient's symptoms and my evaluation I believe she is dealing with both trochanteric bursitis as well as IT band syndrome.  I am going to get her set up for outpatient physical therapy to work on hip fascial stretches and exercises as well as give her a Medrol Dosepak to help with the acute inflammation swelling and pain.  I have instructed the patient that she should give the physical therapy regiment about 6 to 8 weeks and then follow back up if she is not improved.    Carlos Pearl, APRN  7/8/2025  "